# Patient Record
Sex: FEMALE | Race: WHITE | Employment: FULL TIME | URBAN - METROPOLITAN AREA
[De-identification: names, ages, dates, MRNs, and addresses within clinical notes are randomized per-mention and may not be internally consistent; named-entity substitution may affect disease eponyms.]

---

## 2017-02-06 RX ORDER — RISEDRONATE SODIUM 35 MG/1
TABLET, FILM COATED ORAL
Qty: 12 TABLET | Refills: 2 | Status: SHIPPED | OUTPATIENT
Start: 2017-02-06 | End: 2021-08-02 | Stop reason: ALTCHOICE

## 2017-11-06 ENCOUNTER — HOSPITAL ENCOUNTER (OUTPATIENT)
Age: 52
Setting detail: OBSERVATION
Discharge: HOME | End: 2017-11-07
Attending: INTERNAL MEDICINE | Admitting: INTERNAL MEDICINE
Payer: COMMERCIAL

## 2017-11-06 ENCOUNTER — APPOINTMENT (OUTPATIENT)
Dept: GENERAL RADIOLOGY | Age: 52
End: 2017-11-06
Payer: COMMERCIAL

## 2017-11-06 DIAGNOSIS — R07.9 CHEST PAIN, UNSPECIFIED TYPE: Primary | ICD-10-CM

## 2017-11-06 LAB
ALBUMIN SERPL-MCNC: 4.6 G/DL (ref 3.9–4.9)
ALP BLD-CCNC: 61 U/L (ref 40–130)
ALT SERPL-CCNC: 24 U/L (ref 0–33)
ANION GAP SERPL CALCULATED.3IONS-SCNC: 17 MEQ/L (ref 7–13)
APTT: 23.4 SEC (ref 21.6–35.4)
AST SERPL-CCNC: 18 U/L (ref 0–35)
BILIRUB SERPL-MCNC: 0.9 MG/DL (ref 0–1.2)
BUN BLDV-MCNC: 19 MG/DL (ref 6–20)
CALCIUM SERPL-MCNC: 10.1 MG/DL (ref 8.6–10.2)
CHLORIDE BLD-SCNC: 101 MEQ/L (ref 98–107)
CK MB: 3.8 NG/ML (ref 0–3.8)
CO2: 22 MEQ/L (ref 22–29)
CREAT SERPL-MCNC: 0.78 MG/DL (ref 0.5–0.9)
CREATINE KINASE-MB INDEX: 2.7 % (ref 0–3.5)
D DIMER: 0.2 MG/L FEU (ref 0–0.5)
GFR AFRICAN AMERICAN: >60
GFR NON-AFRICAN AMERICAN: >60
GLOBULIN: 2.5 G/DL (ref 2.3–3.5)
GLUCOSE BLD-MCNC: 102 MG/DL (ref 74–109)
HCT VFR BLD CALC: 42.3 % (ref 37–47)
HEMOGLOBIN: 14.2 G/DL (ref 12–16)
INR BLD: 1
MCH RBC QN AUTO: 29.6 PG (ref 27–31.3)
MCHC RBC AUTO-ENTMCNC: 33.6 % (ref 33–37)
MCV RBC AUTO: 88.2 FL (ref 82–100)
PDW BLD-RTO: 13.8 % (ref 11.5–14.5)
PLATELET # BLD: 239 K/UL (ref 130–400)
POTASSIUM SERPL-SCNC: 3.9 MEQ/L (ref 3.5–5.1)
PRO-BNP: 101 PG/ML
PRO-BNP: 125 PG/ML
PROTHROMBIN TIME: 10.2 SEC (ref 8.1–13.7)
RBC # BLD: 4.8 M/UL (ref 4.2–5.4)
SODIUM BLD-SCNC: 140 MEQ/L (ref 132–144)
TOTAL CK: 142 U/L (ref 0–170)
TOTAL PROTEIN: 7.1 G/DL (ref 6.4–8.1)
TROPONIN: <0.01 NG/ML (ref 0–0.01)
WBC # BLD: 9.8 K/UL (ref 4.8–10.8)

## 2017-11-06 PROCEDURE — 96372 THER/PROPH/DIAG INJ SC/IM: CPT

## 2017-11-06 PROCEDURE — 6370000000 HC RX 637 (ALT 250 FOR IP): Performed by: NURSE PRACTITIONER

## 2017-11-06 PROCEDURE — 71010 XR CHEST PORTABLE: CPT

## 2017-11-06 PROCEDURE — 96374 THER/PROPH/DIAG INJ IV PUSH: CPT

## 2017-11-06 PROCEDURE — 85027 COMPLETE CBC AUTOMATED: CPT

## 2017-11-06 PROCEDURE — 6370000000 HC RX 637 (ALT 250 FOR IP): Performed by: PHYSICIAN ASSISTANT

## 2017-11-06 PROCEDURE — G0378 HOSPITAL OBSERVATION PER HR: HCPCS

## 2017-11-06 PROCEDURE — 85610 PROTHROMBIN TIME: CPT

## 2017-11-06 PROCEDURE — 85379 FIBRIN DEGRADATION QUANT: CPT

## 2017-11-06 PROCEDURE — 80053 COMPREHEN METABOLIC PANEL: CPT

## 2017-11-06 PROCEDURE — 82553 CREATINE MB FRACTION: CPT

## 2017-11-06 PROCEDURE — 6360000002 HC RX W HCPCS: Performed by: PHYSICIAN ASSISTANT

## 2017-11-06 PROCEDURE — 6370000000 HC RX 637 (ALT 250 FOR IP): Performed by: INTERNAL MEDICINE

## 2017-11-06 PROCEDURE — 36415 COLL VENOUS BLD VENIPUNCTURE: CPT

## 2017-11-06 PROCEDURE — 83880 ASSAY OF NATRIURETIC PEPTIDE: CPT

## 2017-11-06 PROCEDURE — 6360000002 HC RX W HCPCS: Performed by: INTERNAL MEDICINE

## 2017-11-06 PROCEDURE — 84484 ASSAY OF TROPONIN QUANT: CPT

## 2017-11-06 PROCEDURE — 85730 THROMBOPLASTIN TIME PARTIAL: CPT

## 2017-11-06 PROCEDURE — 93005 ELECTROCARDIOGRAM TRACING: CPT

## 2017-11-06 PROCEDURE — 2580000003 HC RX 258: Performed by: NURSE PRACTITIONER

## 2017-11-06 PROCEDURE — 82550 ASSAY OF CK (CPK): CPT

## 2017-11-06 PROCEDURE — 99285 EMERGENCY DEPT VISIT HI MDM: CPT

## 2017-11-06 RX ORDER — MORPHINE SULFATE 2 MG/ML
2 INJECTION, SOLUTION INTRAMUSCULAR; INTRAVENOUS
Status: DISCONTINUED | OUTPATIENT
Start: 2017-11-06 | End: 2017-11-07 | Stop reason: HOSPADM

## 2017-11-06 RX ORDER — ASPIRIN 81 MG/1
324 TABLET, CHEWABLE ORAL ONCE
Status: COMPLETED | OUTPATIENT
Start: 2017-11-06 | End: 2017-11-06

## 2017-11-06 RX ORDER — LISINOPRIL 5 MG/1
5 TABLET ORAL DAILY
COMMUNITY
End: 2021-08-02 | Stop reason: ALTCHOICE

## 2017-11-06 RX ORDER — ACETAMINOPHEN 325 MG/1
650 TABLET ORAL EVERY 4 HOURS PRN
Status: DISCONTINUED | OUTPATIENT
Start: 2017-11-06 | End: 2017-11-07 | Stop reason: HOSPADM

## 2017-11-06 RX ORDER — ONDANSETRON 2 MG/ML
4 INJECTION INTRAMUSCULAR; INTRAVENOUS ONCE
Status: COMPLETED | OUTPATIENT
Start: 2017-11-06 | End: 2017-11-06

## 2017-11-06 RX ORDER — SODIUM CHLORIDE 0.9 % (FLUSH) 0.9 %
10 SYRINGE (ML) INJECTION PRN
Status: DISCONTINUED | OUTPATIENT
Start: 2017-11-06 | End: 2017-11-07 | Stop reason: HOSPADM

## 2017-11-06 RX ORDER — MORPHINE SULFATE 4 MG/ML
4 INJECTION, SOLUTION INTRAMUSCULAR; INTRAVENOUS
Status: DISCONTINUED | OUTPATIENT
Start: 2017-11-06 | End: 2017-11-07 | Stop reason: HOSPADM

## 2017-11-06 RX ORDER — SODIUM CHLORIDE 0.9 % (FLUSH) 0.9 %
10 SYRINGE (ML) INJECTION EVERY 12 HOURS SCHEDULED
Status: DISCONTINUED | OUTPATIENT
Start: 2017-11-06 | End: 2017-11-07 | Stop reason: HOSPADM

## 2017-11-06 RX ORDER — CALCIUM CARBONATE 500(1250)
500 TABLET ORAL DAILY
Status: DISCONTINUED | OUTPATIENT
Start: 2017-11-06 | End: 2017-11-06

## 2017-11-06 RX ORDER — ONDANSETRON 2 MG/ML
4 INJECTION INTRAMUSCULAR; INTRAVENOUS EVERY 6 HOURS PRN
Status: DISCONTINUED | OUTPATIENT
Start: 2017-11-06 | End: 2017-11-07 | Stop reason: HOSPADM

## 2017-11-06 RX ORDER — ASPIRIN 81 MG/1
81 TABLET, CHEWABLE ORAL DAILY
Status: DISCONTINUED | OUTPATIENT
Start: 2017-11-07 | End: 2017-11-07 | Stop reason: HOSPADM

## 2017-11-06 RX ORDER — ATORVASTATIN CALCIUM 20 MG/1
20 TABLET, FILM COATED ORAL NIGHTLY
Status: DISCONTINUED | OUTPATIENT
Start: 2017-11-06 | End: 2017-11-07 | Stop reason: HOSPADM

## 2017-11-06 RX ORDER — LISINOPRIL 5 MG/1
5 TABLET ORAL DAILY
Status: DISCONTINUED | OUTPATIENT
Start: 2017-11-06 | End: 2017-11-07 | Stop reason: HOSPADM

## 2017-11-06 RX ORDER — NITROGLYCERIN 0.4 MG/1
0.4 TABLET SUBLINGUAL EVERY 5 MIN PRN
Status: DISCONTINUED | OUTPATIENT
Start: 2017-11-06 | End: 2017-11-07 | Stop reason: HOSPADM

## 2017-11-06 RX ADMIN — ASPIRIN 81 MG 324 MG: 81 TABLET ORAL at 12:11

## 2017-11-06 RX ADMIN — ACETAMINOPHEN 650 MG: 325 TABLET ORAL at 21:37

## 2017-11-06 RX ADMIN — ATORVASTATIN CALCIUM 20 MG: 20 TABLET, FILM COATED ORAL at 21:37

## 2017-11-06 RX ADMIN — Medication 30 ML: at 21:38

## 2017-11-06 RX ADMIN — LISINOPRIL 5 MG: 5 TABLET ORAL at 21:37

## 2017-11-06 RX ADMIN — Medication 10 ML: at 21:42

## 2017-11-06 RX ADMIN — NITROGLYCERIN 1 INCH: 20 OINTMENT TOPICAL at 17:04

## 2017-11-06 RX ADMIN — VITAMIN D, TAB 1000IU (100/BT) 1000 UNITS: 25 TAB at 21:38

## 2017-11-06 RX ADMIN — ONDANSETRON 4 MG: 2 INJECTION INTRAMUSCULAR; INTRAVENOUS at 12:12

## 2017-11-06 RX ADMIN — NITROGLYCERIN 1 INCH: 20 OINTMENT TOPICAL at 12:14

## 2017-11-06 RX ADMIN — ENOXAPARIN SODIUM 40 MG: 40 INJECTION, SOLUTION INTRAVENOUS; SUBCUTANEOUS at 17:04

## 2017-11-06 ASSESSMENT — PAIN DESCRIPTION - DESCRIPTORS
DESCRIPTORS: DULL
DESCRIPTORS: ACHING

## 2017-11-06 ASSESSMENT — ENCOUNTER SYMPTOMS
SHORTNESS OF BREATH: 0
ALLERGIC/IMMUNOLOGIC NEGATIVE: 1
ABDOMINAL PAIN: 0
VOMITING: 0
GASTROINTESTINAL NEGATIVE: 1
ABDOMINAL DISTENTION: 0
RHINORRHEA: 0
EYE DISCHARGE: 0
SORE THROAT: 0
NAUSEA: 1
RESPIRATORY NEGATIVE: 1
EYES NEGATIVE: 1
CONSTIPATION: 0
COLOR CHANGE: 0

## 2017-11-06 ASSESSMENT — PAIN SCALES - GENERAL
PAINLEVEL_OUTOF10: 1
PAINLEVEL_OUTOF10: 6
PAINLEVEL_OUTOF10: 3
PAINLEVEL_OUTOF10: 0

## 2017-11-06 ASSESSMENT — PAIN DESCRIPTION - LOCATION
LOCATION: CHEST
LOCATION: CHEST;NECK;ARM

## 2017-11-06 ASSESSMENT — PAIN DESCRIPTION - PAIN TYPE
TYPE: ACUTE PAIN
TYPE: ACUTE PAIN

## 2017-11-06 ASSESSMENT — HEART SCORE: ECG: 0

## 2017-11-06 ASSESSMENT — PAIN DESCRIPTION - ORIENTATION: ORIENTATION: MID;LEFT

## 2017-11-06 NOTE — H&P
[facility] HEART CARDIOLOGY HISTORY AND PHYSICAL NOTE    PATIENT NAME: Ema Kelly  PATIENT MRN: [id number]  SERVICE DATE:  11/6/2017  SERVICE TIME: 2:41 PM    SHARED VISIT CNP: Tyree House NP  PRIMARY CARE PHYSICIAN: Jose Gaytan MD  CARDIOLOGIST : Dr. Raissa Warner: Dr. Pepe Hedrick    ================================================================    CHIEF COMPLAINT:    Chest pain    IMPRESSIONS:  1. Chest pain with both typical and atypical features. No recurrence of midsternal heaviness and nausea. Does have some reproducible pain with inspiration, no other associated symptoms. Initial EKG and troponin normal.  2. HTN, controlled      RECOMMENDATIONS:  1. Repeat EKG, cycle troponins, check echo, d-dimer with next lab draw  2. ASA 81mg QD  3. NTG paste q 6  4. Further recommendations regarding ischemic eval to follow    CARDIAC HISTORY:  1. Normal LV systolic function per stress echo 2002  2. No evidence of ischemia per stress echo 2002  3. No significant valvular heart disease  4. HTN    NON CARDIAC HISTORY:  1. Osteopenia  2. Obesity    HPI:   Ema Kelly is a 45 y.o. female who presented to the emergency department with complaints of midsternal chest pain. Initial episode occurred late last week, which she describes as a sensation of midsternal heaviness, accompanied by shortness of breath and nausea. The initial episode occurred while she was driving home from work, when she got into the house she proceeded to take one of her 's nitroglycerin with complete relief. She has had no recurrence over the weekend. Today while at work she noticed a similar sensation of heaviness this time radiating to her neck. She felt very nauseated, lightheaded, and short of breath. She called her family doctor and was instructed to go to the emergency room for further evaluation. She was given baby aspirin and nitroglycerin with complete relief.  Initial EKG shows no acute ischemic abnormalities, normal sinus rhythm. Initial troponin is negative. No other lab abnormalities noted. Chest x-ray shows no active cardiopulmonary disease. She has had no recurrence of discomfort since her admission, with the exception of some chest pain that is reproduced with deep inspiration. PROBLEM LIST:  Patient Active Problem List   Diagnosis    Calculus of gallbladder       PAST MEDICAL HISTORY:    Past Medical History:   Diagnosis Date    Osteopenia 2013       PAST SURGICAL HISTORY:  Past Surgical History:   Procedure Laterality Date    HYSTERECTOMY         MEDICATIONS:  No current facility-administered medications for this encounter. Current Outpatient Prescriptions   Medication Sig Dispense Refill    lisinopril (PRINIVIL;ZESTRIL) 5 MG tablet Take 5 mg by mouth daily      risedronate (ACTONEL) 35 MG tablet TAKE 1 TABLET BY MOUTH ONCE A WEEK AS DIRECTED 12 tablet 2    estradiol (CLIMARA) 0.05 MG/24HR APPLY PATCH TO DRY SKIN ONCE EVERY WEEK 24 patch 1    Vitamin D (CHOLECALCIFEROL) 1000 UNITS CAPS capsule Take 1,000 Units by mouth daily.  calcium carbonate (OSCAL) 500 MG TABS tablet Take 500 mg by mouth daily. ALLERGIES AND DRUG INTOLERANCES:   Allergies   Allergen Reactions    Penicillins      hives    Sulfa Antibiotics      hives       FAMILY HISTORY:    Family History   Problem Relation Age of Onset    Breast Cancer Paternal Grandmother     Breast Cancer Paternal Aunt        SOCIAL HISTORY:    Social History     Social History    Marital status:      Spouse name: N/A    Number of children: N/A    Years of education: N/A     Occupational History    Not on file.      Social History Main Topics    Smoking status: Never Smoker    Smokeless tobacco: Not on file    Alcohol use No    Drug use: Unknown    Sexual activity: Yes     Partners: Male     Other Topics Concern    Not on file     Social History Narrative    No narrative on file       ROS:   Review Results   Component Value Date    PROBNP 125 11/06/2017      TSH: No results found for: TSH   Cardiac Injury Profile:   Recent Labs      11/06/17   1215   CKTOTAL  142   CKMB  3.8   TROPONINI  <0.010      Lipid Profile: No results found for: TRIG, HDL, LDLCALC, CHOL   Hemoglobin A1C: No components found for: HGBA1C       ================================================================      Thank you for your consideration for this consultation.   This H & P was completed after chart review and bedside examination done by myself in conjunction with Dr. Alfredo Phillips: Edie Pardo RN, MSN, CNP

## 2017-11-06 NOTE — ED TRIAGE NOTES
Pt states she started having midsternal chest pain radiating into her left arm and up neck on the way to work around 1030a this morning. Pt statse she became very nauseated but denies any vomiting. Pt denies any fever, chills, diarrhea. Pt states this happened thurs and she took 1 nitro from her  and pain went away. Pt states this pain is a squeezing type pain and made her dizzy and sob. Pt is a+o x4 msps intact lungs clear bilat.

## 2017-11-06 NOTE — ED PROVIDER NOTES
[address] [facility] [place], [place] ED  eMERGENCY dEPARTMENT eNCOUnter      Pt Name: Raven Isaac  MRN: [id number]  [place] 1965  Date of evaluation: 11/6/2017  Provider: Makayla Zhao PA-C    CHIEF COMPLAINT       Chief Complaint   Patient presents with    Chest Pain    Shortness of Breath         HISTORY OF PRESENT ILLNESS   (Location/Symptom, Timing/Onset, Context/Setting, Quality, Duration, Modifying Factors, Severity)  Note limiting factors. Raven Isaac is a 45 y.o. female who presents to the emergency department With complaint of midsternal chest pain which patient states started last Thursday. She states she was driving on the road had a sudden onset of midsternal chest pain she became short of breath and nauseated she states it lasted for approximately one hour, she drove home she states she took one of her 's nitroglycerin and her pain was 100% resolved. She states she did not have any episodes over the weekend she said she was on her way to work this morning had midsternal chest pain again with radiation up into her neck became very nauseated again. He contacted her family doctor and they advised her to come to the emergency department. She is rating her pain at this time as a 2 or 3 more of an annoyance, but still feels very nauseated. She denies any shortness of breath she denies any acute injury. She denies any past cardiac history denies any past family history of heart disease. Pt is requesting [facility], Dr Charly Laird, since that whom her  is followed by. HPI    Nursing Notes were reviewed. REVIEW OF SYSTEMS    (2-9 systems for level 4, 10 or more for level 5)     Review of Systems   Constitutional: Negative for activity change and appetite change. HENT: Negative for congestion, ear discharge, ear pain, nosebleeds, rhinorrhea and sore throat. Eyes: Negative for discharge. Respiratory: Negative for shortness of breath. Cardiovascular: Positive for chest pain. Negative for palpitations and leg swelling. Gastrointestinal: Positive for nausea. Negative for abdominal distention, abdominal pain, constipation and vomiting. Genitourinary: Negative for decreased urine volume, difficulty urinating, dysuria and urgency. Musculoskeletal: Positive for neck pain. Negative for arthralgias, myalgias and neck stiffness. Skin: Negative for color change, pallor and rash. Neurological: Negative for dizziness, tremors, syncope, weakness, numbness and headaches. Psychiatric/Behavioral: Negative for agitation and confusion. Except as noted above the remainder of the review of systems was reviewed and negative. PAST MEDICAL HISTORY     Past Medical History:   Diagnosis Date    Osteopenia 2013         SURGICAL HISTORY       Past Surgical History:   Procedure Laterality Date    HYSTERECTOMY           CURRENT MEDICATIONS       Previous Medications    CALCIUM CARBONATE (OSCAL) 500 MG TABS TABLET    Take 500 mg by mouth daily. ESTRADIOL (CLIMARA) 0.05 MG/24HR    APPLY PATCH TO DRY SKIN ONCE EVERY WEEK    LISINOPRIL (PRINIVIL;ZESTRIL) 5 MG TABLET    Take 5 mg by mouth daily    RISEDRONATE (ACTONEL) 35 MG TABLET    TAKE 1 TABLET BY MOUTH ONCE A WEEK AS DIRECTED    VITAMIN D (CHOLECALCIFEROL) 1000 UNITS CAPS CAPSULE    Take 1,000 Units by mouth daily.        ALLERGIES     Penicillins and Sulfa antibiotics    FAMILY HISTORY       Family History   Problem Relation Age of Onset    Breast Cancer Paternal Grandmother     Breast Cancer Paternal Aunt           SOCIAL HISTORY       Social History     Social History    Marital status:      Spouse name: N/A    Number of children: N/A    Years of education: N/A     Social History Main Topics    Smoking status: Never Smoker    Smokeless tobacco: Not on file    Alcohol use No    Drug use: Unknown    Sexual activity: Yes     Partners: Male     Other Topics Concern    Not on file     Social History Narrative    No note that portions of this note were completed with a voice recognition program.  Efforts were made to edit the dictations but occasionally words are mis-transcribed.)    Nigel Mike PA-C (electronically signed)  Attending Emergency Physician         Nigel Mike PA-C  11/06/17 [id number]

## 2017-11-06 NOTE — LETTER
[facility] 5W Observation  [facility] [address]  [place] [address] [facility]  Phone: [phone]             November 7, 2017    Patient: Maxx Farris   YOB: 1965   Date of Visit: 11/6/2017       To Whom It May Concern:    Melinda Gilliland was seen and treated in our facility  beginning 11/6/2017 until 11/7/17 . She may return to work on 11/8/17.       Sincerely,       Saritha Muller NP         Signature:__________________________________

## 2017-11-07 ENCOUNTER — APPOINTMENT (OUTPATIENT)
Dept: NUCLEAR MEDICINE | Age: 52
End: 2017-11-07
Payer: COMMERCIAL

## 2017-11-07 VITALS
TEMPERATURE: 97.5 F | OXYGEN SATURATION: 98 % | WEIGHT: 180 LBS | DIASTOLIC BLOOD PRESSURE: 68 MMHG | SYSTOLIC BLOOD PRESSURE: 91 MMHG | RESPIRATION RATE: 18 BRPM | HEIGHT: 61 IN | BODY MASS INDEX: 33.99 KG/M2 | HEART RATE: 62 BPM

## 2017-11-07 LAB
ANION GAP SERPL CALCULATED.3IONS-SCNC: 10 MEQ/L (ref 7–13)
BUN BLDV-MCNC: 19 MG/DL (ref 6–20)
CALCIUM SERPL-MCNC: 9.1 MG/DL (ref 8.6–10.2)
CHLORIDE BLD-SCNC: 102 MEQ/L (ref 98–107)
CHOLESTEROL, TOTAL: 194 MG/DL (ref 0–199)
CO2: 28 MEQ/L (ref 22–29)
CREAT SERPL-MCNC: 0.78 MG/DL (ref 0.5–0.9)
EKG ATRIAL RATE: 51 BPM
EKG ATRIAL RATE: 67 BPM
EKG P AXIS: -8 DEGREES
EKG P AXIS: 8 DEGREES
EKG P-R INTERVAL: 124 MS
EKG P-R INTERVAL: 150 MS
EKG Q-T INTERVAL: 410 MS
EKG Q-T INTERVAL: 454 MS
EKG QRS DURATION: 80 MS
EKG QRS DURATION: 88 MS
EKG QTC CALCULATION (BAZETT): 418 MS
EKG QTC CALCULATION (BAZETT): 433 MS
EKG R AXIS: -1 DEGREES
EKG R AXIS: 23 DEGREES
EKG T AXIS: 43 DEGREES
EKG T AXIS: 57 DEGREES
EKG VENTRICULAR RATE: 51 BPM
EKG VENTRICULAR RATE: 67 BPM
GFR AFRICAN AMERICAN: >60
GFR NON-AFRICAN AMERICAN: >60
GLUCOSE BLD-MCNC: 91 MG/DL (ref 74–109)
HCT VFR BLD CALC: 38.8 % (ref 37–47)
HDLC SERPL-MCNC: 48 MG/DL (ref 40–59)
HEMOGLOBIN: 12.8 G/DL (ref 12–16)
INR BLD: 1
LDL CHOLESTEROL CALCULATED: 124 MG/DL (ref 0–129)
LV EF: 74 %
LVEF MODALITY: NORMAL
MAGNESIUM: 2.4 MG/DL (ref 1.7–2.3)
MCH RBC QN AUTO: 29.4 PG (ref 27–31.3)
MCHC RBC AUTO-ENTMCNC: 33 % (ref 33–37)
MCV RBC AUTO: 89.4 FL (ref 82–100)
PDW BLD-RTO: 13.7 % (ref 11.5–14.5)
PLATELET # BLD: 211 K/UL (ref 130–400)
POTASSIUM SERPL-SCNC: 4.8 MEQ/L (ref 3.5–5.1)
PROTHROMBIN TIME: 10.6 SEC (ref 8.1–13.7)
RBC # BLD: 4.34 M/UL (ref 4.2–5.4)
SODIUM BLD-SCNC: 140 MEQ/L (ref 132–144)
TRIGL SERPL-MCNC: 109 MG/DL (ref 0–200)
WBC # BLD: 7.7 K/UL (ref 4.8–10.8)

## 2017-11-07 PROCEDURE — 85027 COMPLETE CBC AUTOMATED: CPT

## 2017-11-07 PROCEDURE — 83735 ASSAY OF MAGNESIUM: CPT

## 2017-11-07 PROCEDURE — 78452 HT MUSCLE IMAGE SPECT MULT: CPT

## 2017-11-07 PROCEDURE — 6370000000 HC RX 637 (ALT 250 FOR IP): Performed by: NURSE PRACTITIONER

## 2017-11-07 PROCEDURE — 85610 PROTHROMBIN TIME: CPT

## 2017-11-07 PROCEDURE — 93306 TTE W/DOPPLER COMPLETE: CPT

## 2017-11-07 PROCEDURE — G0378 HOSPITAL OBSERVATION PER HR: HCPCS

## 2017-11-07 PROCEDURE — 80048 BASIC METABOLIC PNL TOTAL CA: CPT

## 2017-11-07 PROCEDURE — 93017 CV STRESS TEST TRACING ONLY: CPT

## 2017-11-07 PROCEDURE — A9502 TC99M TETROFOSMIN: HCPCS | Performed by: INTERNAL MEDICINE

## 2017-11-07 PROCEDURE — G0008 ADMIN INFLUENZA VIRUS VAC: HCPCS | Performed by: INTERNAL MEDICINE

## 2017-11-07 PROCEDURE — 2580000003 HC RX 258: Performed by: NURSE PRACTITIONER

## 2017-11-07 PROCEDURE — 80061 LIPID PANEL: CPT

## 2017-11-07 PROCEDURE — 93005 ELECTROCARDIOGRAM TRACING: CPT

## 2017-11-07 PROCEDURE — 90656 IIV3 VACC NO PRSV 0.5 ML IM: CPT | Performed by: INTERNAL MEDICINE

## 2017-11-07 PROCEDURE — 36415 COLL VENOUS BLD VENIPUNCTURE: CPT

## 2017-11-07 PROCEDURE — 6360000002 HC RX W HCPCS: Performed by: INTERNAL MEDICINE

## 2017-11-07 PROCEDURE — 3430000000 HC RX DIAGNOSTIC RADIOPHARMACEUTICAL: Performed by: INTERNAL MEDICINE

## 2017-11-07 RX ORDER — SODIUM CHLORIDE 0.9 % (FLUSH) 0.9 %
10 SYRINGE (ML) INJECTION PRN
Status: DISCONTINUED | OUTPATIENT
Start: 2017-11-07 | End: 2017-11-07 | Stop reason: HOSPADM

## 2017-11-07 RX ADMIN — ASPIRIN 81 MG 81 MG: 81 TABLET ORAL at 13:29

## 2017-11-07 RX ADMIN — Medication 20 ML: at 11:19

## 2017-11-07 RX ADMIN — A/SINGAPORE/GP1908/2015 IVR-180 (AN A/MICHIGAN/45/2015 (H1N1)PDM09-LIKE VIRUS, A/HONG KONG/4801/2014, NYMC X-263B (H3N2) (AN A/HONG KONG/4801/2014-LIKE VIRUS), AND B/BRISBANE/60/2008, WILD TYPE (A B/BRISBANE/60/2008-LIKE VIRUS) 0.5 ML: 15; 15; 15 INJECTION, SUSPENSION INTRAMUSCULAR at 13:33

## 2017-11-07 RX ADMIN — TETROFOSMIN 11.6 MILLICURIE: 0.23 INJECTION, POWDER, LYOPHILIZED, FOR SOLUTION INTRAVENOUS at 08:45

## 2017-11-07 RX ADMIN — Medication 10 ML: at 08:56

## 2017-11-07 RX ADMIN — ACETAMINOPHEN 650 MG: 325 TABLET ORAL at 13:30

## 2017-11-07 RX ADMIN — TETROFOSMIN 31 MILLICURIE: 0.23 INJECTION, POWDER, LYOPHILIZED, FOR SOLUTION INTRAVENOUS at 10:33

## 2017-11-07 RX ADMIN — Medication 10 ML: at 08:15

## 2017-11-07 ASSESSMENT — PAIN SCALES - GENERAL: PAINLEVEL_OUTOF10: 5

## 2017-11-07 NOTE — FLOWSHEET NOTE
Vitals obtained. A.M. Assessment complete. Pt denies CP at this time. SB on tele. No SOB noted. Pt going down for stress test this a.m. No current needs at this time. Call light within reach. Electronically signed by Luisana Jimenez.  Yanique Alva on 11/7/2017 at 8:38 AM

## 2017-11-07 NOTE — PROGRESS NOTES
IV heplock removed. Catheter tip intact. Pt discharge instructions given. Pt verbalized understanding with no further questions. Pt to main lobby via w/c with belongings and d/c instructions. Electronically signed by Kelsea Mitchell on 11/7/2017 at 4:05 PM

## 2017-11-07 NOTE — FLOWSHEET NOTE
Pt to NM for stress test via w/c. Electronically signed by Roz Ng.  Oren Kenyon on 11/7/2017 at 8:41 AM

## 2017-11-07 NOTE — DISCHARGE SUMMARY
[facility] HEART   Discharge Note      Name:  Kary Guzman  YOB: 1965  Medical Record Number:  [id number]  Date of Admission:  11/6/2017  Date of Discharge:  11/7/2017    Admitting physician:  Hollie Kaur MD  Discharge Attending:  Mary Ramires CNP  Primary Care Physician:  Darius Callahan MD  Consultants[de-identified] none  Discharge to:  [address] [place] [address] LIST:  Chest pain    DISCHARGE DIAGNOSES:  Principal Problem:    Chest pain in adult      PROCEDURES:    stress [facility] COURSE :   Kary Guzman is a 45 old female who presented to the emergency department with complaints of midsternal chest pain accompanied by shortness of breath and nausea. The patient was initially relieved by sublingual nitroglycerin. She did have reproducible pain with deep inspiration or palpation to the mid chest area. Serial troponins negative, and EKGs have remained unremarkable for acute ischemic abnormalities. A d-dimer was obtained which was within normal limits. Given both typical and atypical features of her chest discomfort she underwent an exercise perfusion study on 11/7/17 which was negative for ischemia. She is presently chest pain-free and hemodynamically stable. Disposition is to be discharged home on low-dose aspirin, statin, and her antihypertensive therapy with lisinopril. She will have outpatient follow-up with cardiology in 4 weeks. Should she have worsening or recurrence of symptoms she is to seek emergency medical care. Patient verbalized understanding and is in agreement with discharge plan of care. PHYSICAL EXAM:  Vitals:    11/07/17 1330   BP: 91/68   Pulse: 62   Resp:    Temp:    SpO2: 98%     Chest: Clear to ausculation. No wheezing.   Cardiac: Cor RRR  Abdomen/groin: soft, non-tender, without masses or organomegaly and soft  Lower extremity edema: none    LAB:  Recent Results (from the past 72 hour(s))   Comprehensive Metabolic Panel    Collection Time: 11/06/17 12:15 PM   Result Value Ref Range    Sodium 140 132 - 144 mEq/L    Potassium 3.9 3.5 - 5.1 mEq/L    Chloride 101 98 - 107 mEq/L    CO2 22 22 - 29 mEq/L    Anion Gap 17 (H) 7 - 13 mEq/L    Glucose 102 74 - 109 mg/dL    BUN 19 6 - 20 mg/dL    CREATININE 0.78 0.50 - 0.90 mg/dL    GFR Non-African American >60.0 >60    GFR  >60.0 >60    Calcium 10.1 8.6 - 10.2 mg/dL    Total Protein 7.1 6.4 - 8.1 g/dL    Alb 4.6 3.9 - 4.9 g/dL    Total Bilirubin 0.9 0.0 - 1.2 mg/dL    Alkaline Phosphatase 61 40 - 130 U/L    ALT 24 0 - 33 U/L    AST 18 0 - 35 U/L    Globulin 2.5 2.3 - 3.5 g/dL   CBC    Collection Time: 11/06/17 12:15 PM   Result Value Ref Range    WBC 9.8 4.8 - 10.8 K/uL    RBC 4.80 4.20 - 5.40 M/uL    Hemoglobin 14.2 12.0 - 16.0 g/dL    Hematocrit 42.3 37.0 - 47.0 %    MCV 88.2 82.0 - 100.0 fL    MCH 29.6 27.0 - 31.3 pg    MCHC 33.6 33.0 - 37.0 %    RDW 13.8 11.5 - 14.5 %    Platelets 963 969 - 002 K/uL   Troponin    Collection Time: 11/06/17 12:15 PM   Result Value Ref Range    Troponin <0.010 0.000 - 0.010 ng/mL   CK-MB Index    Collection Time: 11/06/17 12:15 PM   Result Value Ref Range    Total  0 - 170 U/L    CK-MB 3.8 0.0 - 3.8 ng/mL    CK-MB Index 2.7 0.0 - 3.5 %   Protime-INR    Collection Time: 11/06/17 12:15 PM   Result Value Ref Range    Protime 10.2 8.1 - 13.7 sec    INR 1.0    APTT    Collection Time: 11/06/17 12:15 PM   Result Value Ref Range    aPTT 23.4 21.6 - 35.4 sec   Brain Natriuretic Peptide    Collection Time: 11/06/17 12:15 PM   Result Value Ref Range    Pro- pg/mL   EKG 12 Lead    Collection Time: 11/06/17 12:53 PM   Result Value Ref Range    Ventricular Rate 67 BPM    Atrial Rate 67 BPM    P-R Interval 124 ms    QRS Duration 80 ms    Q-T Interval 410 ms    QTc Calculation (Bazett) 433 ms    P Axis -8 degrees    R Axis -1 degrees    T Axis 43 degrees   Troponin    Collection Time: 11/06/17  4:15 PM   Result Value Ref Range    Troponin <0.010 0.000 - 0.010 ng/mL D-dimer, quantitative    Collection Time: 11/06/17  4:15 PM   Result Value Ref Range    D-Dimer, Quant 0.20 0.00 - 0.50 mg/L FEU   Brain natriuretic peptide    Collection Time: 11/06/17  4:15 PM   Result Value Ref Range    Pro- pg/mL   Troponin    Collection Time: 11/06/17  8:01 PM   Result Value Ref Range    Troponin <0.010 0.000 - 0.010 ng/mL   Basic metabolic panel    Collection Time: 11/07/17  5:44 AM   Result Value Ref Range    Sodium 140 132 - 144 mEq/L    Potassium 4.8 3.5 - 5.1 mEq/L    Chloride 102 98 - 107 mEq/L    CO2 28 22 - 29 mEq/L    Anion Gap 10 7 - 13 mEq/L    Glucose 91 74 - 109 mg/dL    BUN 19 6 - 20 mg/dL    CREATININE 0.78 0.50 - 0.90 mg/dL    GFR Non-African American >60.0 >60    GFR  >60.0 >60    Calcium 9.1 8.6 - 10.2 mg/dL   Magnesium    Collection Time: 11/07/17  5:44 AM   Result Value Ref Range    Magnesium 2.4 (H) 1.7 - 2.3 mg/dL   Lipid panel - fasting    Collection Time: 11/07/17  5:44 AM   Result Value Ref Range    Cholesterol, Total 194 0 - 199 mg/dL    Triglycerides 109 0 - 200 mg/dL    HDL 48 40 - 59 mg/dL    LDL Calculated 124 0 - 129 mg/dL   CBC    Collection Time: 11/07/17  5:44 AM   Result Value Ref Range    WBC 7.7 4.8 - 10.8 K/uL    RBC 4.34 4.20 - 5.40 M/uL    Hemoglobin 12.8 12.0 - 16.0 g/dL    Hematocrit 38.8 37.0 - 47.0 %    MCV 89.4 82.0 - 100.0 fL    MCH 29.4 27.0 - 31.3 pg    MCHC 33.0 33.0 - 37.0 %    RDW 13.7 11.5 - 14.5 %    Platelets 835 018 - 047 K/uL   Protime-INR    Collection Time: 11/07/17  5:44 AM   Result Value Ref Range    Protime 10.6 8.1 - 13.7 sec    INR 1.0    EKG 12 lead    Collection Time: 11/07/17  7:47 AM   Result Value Ref Range    Ventricular Rate 51 BPM    Atrial Rate 51 BPM    P-R Interval 150 ms    QRS Duration 88 ms    Q-T Interval 454 ms    QTc Calculation (Bazett) 418 ms    P Axis 8 degrees    R Axis 23 degrees    T Axis 57 degrees         DISCHARGE MEDICATIONS:   Kendellne Rosalio   Home Medication Instructions

## 2017-11-07 NOTE — FLOWSHEET NOTE
Shift assessment complete along with vital signs. Pt is alert and orientated x 4. Pink,warm and dry. Pt states that she feels slight pressure in her chest her main complaint at the moment is she has a headache that is in the back of her head and rates it a 8/10. Significant other is in room with pt. No sob noted. Pt has call light within reach. No other current needs at this time.  Electronically signed by Norma Golden RN on 11/6/2017 at 8:55 PM

## 2017-11-08 NOTE — PROCEDURES
myocardial ischemia or myocardial infarction by EKG  or perfusion criteria. 4.  Normal LV systolic function with calculated LV ejection fraction  of 83%. 5.   Low risk exercise Myoview cardiac perfusion stress test.      Santi Drew MD    D: 11/08/2017 12:14:24       T: 11/08/2017 12:42:14     [id number]  Job#: [id number]     Doc#: [id number]

## 2017-12-09 ENCOUNTER — HOSPITAL ENCOUNTER (OUTPATIENT)
Dept: WOMENS IMAGING | Age: 52
Discharge: HOME | End: 2017-12-09
Payer: COMMERCIAL

## 2017-12-09 DIAGNOSIS — Z12.31 ENCOUNTER FOR SCREENING MAMMOGRAM FOR BREAST CANCER: ICD-10-CM

## 2017-12-09 PROCEDURE — 77063 BREAST TOMOSYNTHESIS BI: CPT

## 2019-03-16 ENCOUNTER — HOSPITAL ENCOUNTER (OUTPATIENT)
Dept: WOMENS IMAGING | Age: 54
Discharge: HOME | End: 2019-03-18
Payer: COMMERCIAL

## 2019-03-16 DIAGNOSIS — Z13.820 ENCOUNTER FOR SCREENING FOR OSTEOPOROSIS: ICD-10-CM

## 2019-03-16 DIAGNOSIS — Z12.31 ENCOUNTER FOR SCREENING MAMMOGRAM FOR MALIGNANT NEOPLASM OF BREAST: ICD-10-CM

## 2019-03-16 PROCEDURE — 77063 BREAST TOMOSYNTHESIS BI: CPT

## 2019-03-16 PROCEDURE — 77080 DXA BONE DENSITY AXIAL: CPT

## 2019-03-18 ENCOUNTER — OUTSIDE SERVICES (OUTPATIENT)
Dept: GASTROENTEROLOGY | Age: 54
End: 2019-03-18
Payer: COMMERCIAL

## 2019-03-18 DIAGNOSIS — Z12.11 COLON CANCER SCREENING: Primary | ICD-10-CM

## 2019-03-18 PROCEDURE — 45390 COLONOSCOPY W/RESECTION: CPT | Performed by: INTERNAL MEDICINE

## 2021-01-11 ENCOUNTER — HOSPITAL ENCOUNTER (OUTPATIENT)
Dept: WOMENS IMAGING | Age: 56
Discharge: HOME | End: 2021-01-13
Payer: COMMERCIAL

## 2021-01-11 ENCOUNTER — HOSPITAL ENCOUNTER (OUTPATIENT)
Dept: LAB | Age: 56
Discharge: HOME | End: 2021-01-11
Payer: COMMERCIAL

## 2021-01-11 DIAGNOSIS — Z12.31 BREAST CANCER SCREENING BY MAMMOGRAM: ICD-10-CM

## 2021-01-11 LAB
ALBUMIN SERPL-MCNC: 4.3 G/DL (ref 3.5–4.6)
ALP BLD-CCNC: 70 U/L (ref 40–130)
ALT SERPL-CCNC: 19 U/L (ref 0–33)
ANION GAP SERPL CALCULATED.3IONS-SCNC: 12 MEQ/L (ref 9–15)
AST SERPL-CCNC: 18 U/L (ref 0–35)
BASOPHILS ABSOLUTE: 0 K/UL (ref 0–0.2)
BASOPHILS RELATIVE PERCENT: 0.4 %
BILIRUB SERPL-MCNC: 0.5 MG/DL (ref 0.2–0.7)
BUN BLDV-MCNC: 23 MG/DL (ref 6–20)
CALCIUM SERPL-MCNC: 9.2 MG/DL (ref 8.5–9.9)
CHLORIDE BLD-SCNC: 104 MEQ/L (ref 95–107)
CHOLESTEROL, TOTAL: 198 MG/DL (ref 0–199)
CO2: 22 MEQ/L (ref 20–31)
CREAT SERPL-MCNC: 0.82 MG/DL (ref 0.5–0.9)
EOSINOPHILS ABSOLUTE: 0.2 K/UL (ref 0–0.7)
EOSINOPHILS RELATIVE PERCENT: 1.9 %
GFR AFRICAN AMERICAN: >60
GFR NON-AFRICAN AMERICAN: >60
GLOBULIN: 2.5 G/DL (ref 2.3–3.5)
GLUCOSE BLD-MCNC: 89 MG/DL (ref 70–99)
HCT VFR BLD CALC: 38.7 % (ref 37–47)
HDLC SERPL-MCNC: 50 MG/DL (ref 40–59)
HEMOGLOBIN: 12.8 G/DL (ref 12–16)
LDL CHOLESTEROL CALCULATED: 126 MG/DL (ref 0–129)
LYMPHOCYTES ABSOLUTE: 2.6 K/UL (ref 1–4.8)
LYMPHOCYTES RELATIVE PERCENT: 32.9 %
MCH RBC QN AUTO: 29.9 PG (ref 27–31.3)
MCHC RBC AUTO-ENTMCNC: 33.1 % (ref 33–37)
MCV RBC AUTO: 90.4 FL (ref 82–100)
MONOCYTES ABSOLUTE: 0.8 K/UL (ref 0.2–0.8)
MONOCYTES RELATIVE PERCENT: 9.9 %
NEUTROPHILS ABSOLUTE: 4.3 K/UL (ref 1.4–6.5)
NEUTROPHILS RELATIVE PERCENT: 54.9 %
PDW BLD-RTO: 13.5 % (ref 11.5–14.5)
PLATELET # BLD: 255 K/UL (ref 130–400)
POTASSIUM SERPL-SCNC: 4.2 MEQ/L (ref 3.4–4.9)
RBC # BLD: 4.28 M/UL (ref 4.2–5.4)
SODIUM BLD-SCNC: 138 MEQ/L (ref 135–144)
TOTAL PROTEIN: 6.8 G/DL (ref 6.3–8)
TRIGL SERPL-MCNC: 110 MG/DL (ref 0–150)
WBC # BLD: 7.9 K/UL (ref 4.8–10.8)

## 2021-01-11 PROCEDURE — 85025 COMPLETE CBC W/AUTO DIFF WBC: CPT

## 2021-01-11 PROCEDURE — 36415 COLL VENOUS BLD VENIPUNCTURE: CPT

## 2021-01-11 PROCEDURE — 77067 SCR MAMMO BI INCL CAD: CPT

## 2021-01-11 PROCEDURE — 80053 COMPREHEN METABOLIC PANEL: CPT

## 2021-01-11 PROCEDURE — 80061 LIPID PANEL: CPT

## 2021-08-02 ENCOUNTER — OFFICE VISIT (OUTPATIENT)
Dept: INTERNAL MEDICINE | Age: 56
End: 2021-08-02
Payer: COMMERCIAL

## 2021-08-02 VITALS
WEIGHT: 220 LBS | OXYGEN SATURATION: 98 % | TEMPERATURE: 97.7 F | SYSTOLIC BLOOD PRESSURE: 124 MMHG | DIASTOLIC BLOOD PRESSURE: 86 MMHG | HEART RATE: 64 BPM | BODY MASS INDEX: 38.98 KG/M2 | HEIGHT: 63 IN

## 2021-08-02 DIAGNOSIS — I10 ESSENTIAL HYPERTENSION: ICD-10-CM

## 2021-08-02 DIAGNOSIS — F43.23 ADJUSTMENT DISORDER WITH MIXED ANXIETY AND DEPRESSED MOOD: Primary | ICD-10-CM

## 2021-08-02 PROCEDURE — G8427 DOCREV CUR MEDS BY ELIG CLIN: HCPCS | Performed by: PHYSICIAN ASSISTANT

## 2021-08-02 PROCEDURE — G8417 CALC BMI ABV UP PARAM F/U: HCPCS | Performed by: PHYSICIAN ASSISTANT

## 2021-08-02 PROCEDURE — 3017F COLORECTAL CA SCREEN DOC REV: CPT | Performed by: PHYSICIAN ASSISTANT

## 2021-08-02 PROCEDURE — 1036F TOBACCO NON-USER: CPT | Performed by: PHYSICIAN ASSISTANT

## 2021-08-02 PROCEDURE — 99203 OFFICE O/P NEW LOW 30 MIN: CPT | Performed by: PHYSICIAN ASSISTANT

## 2021-08-02 RX ORDER — BUSPIRONE HYDROCHLORIDE 15 MG/1
TABLET ORAL
COMMUNITY
Start: 2021-06-19 | End: 2022-03-01 | Stop reason: SDUPTHER

## 2021-08-02 RX ORDER — PAROXETINE HYDROCHLORIDE 40 MG/1
TABLET, FILM COATED ORAL
COMMUNITY
Start: 2021-07-31 | End: 2022-07-12 | Stop reason: SDUPTHER

## 2021-08-02 RX ORDER — TRAZODONE HYDROCHLORIDE 50 MG/1
50 TABLET ORAL NIGHTLY
Qty: 30 TABLET | Refills: 0 | Status: SHIPPED | OUTPATIENT
Start: 2021-08-02 | End: 2021-08-30 | Stop reason: SDUPTHER

## 2021-08-02 SDOH — ECONOMIC STABILITY: FOOD INSECURITY: WITHIN THE PAST 12 MONTHS, THE FOOD YOU BOUGHT JUST DIDN'T LAST AND YOU DIDN'T HAVE MONEY TO GET MORE.: NEVER TRUE

## 2021-08-02 SDOH — ECONOMIC STABILITY: FOOD INSECURITY: WITHIN THE PAST 12 MONTHS, YOU WORRIED THAT YOUR FOOD WOULD RUN OUT BEFORE YOU GOT MONEY TO BUY MORE.: NEVER TRUE

## 2021-08-02 ASSESSMENT — PATIENT HEALTH QUESTIONNAIRE - PHQ9
9. THOUGHTS THAT YOU WOULD BE BETTER OFF DEAD, OR OF HURTING YOURSELF: 0
2. FEELING DOWN, DEPRESSED OR HOPELESS: 2
6. FEELING BAD ABOUT YOURSELF - OR THAT YOU ARE A FAILURE OR HAVE LET YOURSELF OR YOUR FAMILY DOWN: 1
4. FEELING TIRED OR HAVING LITTLE ENERGY: 2
7. TROUBLE CONCENTRATING ON THINGS, SUCH AS READING THE NEWSPAPER OR WATCHING TELEVISION: 0
SUM OF ALL RESPONSES TO PHQ QUESTIONS 1-9: 9
3. TROUBLE FALLING OR STAYING ASLEEP: 2
SUM OF ALL RESPONSES TO PHQ9 QUESTIONS 1 & 2: 3
1. LITTLE INTEREST OR PLEASURE IN DOING THINGS: 1
SUM OF ALL RESPONSES TO PHQ QUESTIONS 1-9: 9
5. POOR APPETITE OR OVEREATING: 0
8. MOVING OR SPEAKING SO SLOWLY THAT OTHER PEOPLE COULD HAVE NOTICED. OR THE OPPOSITE, BEING SO FIGETY OR RESTLESS THAT YOU HAVE BEEN MOVING AROUND A LOT MORE THAN USUAL: 1
SUM OF ALL RESPONSES TO PHQ QUESTIONS 1-9: 9
10. IF YOU CHECKED OFF ANY PROBLEMS, HOW DIFFICULT HAVE THESE PROBLEMS MADE IT FOR YOU TO DO YOUR WORK, TAKE CARE OF THINGS AT HOME, OR GET ALONG WITH OTHER PEOPLE: 1

## 2021-08-02 ASSESSMENT — SOCIAL DETERMINANTS OF HEALTH (SDOH): HOW HARD IS IT FOR YOU TO PAY FOR THE VERY BASICS LIKE FOOD, HOUSING, MEDICAL CARE, AND HEATING?: NOT HARD AT ALL

## 2021-08-02 ASSESSMENT — ENCOUNTER SYMPTOMS
GASTROINTESTINAL NEGATIVE: 1
RESPIRATORY NEGATIVE: 1

## 2021-08-02 NOTE — PROGRESS NOTES
21    Phill Adair (: 1965) is a 60 y.o. female, New patient, here for a preventive medicine evaluation. HPI    Patient Active Problem List   Diagnosis    Calculus of gallbladder    Chest pain in adult    Adenomatous polyp of ascending colon    Essential hypertension       New patient here to establish care   Stress, moved into with dad with dementia    has heart issues , and she is working 2 jobs   Se is current taking Buspar 15 mg BID  and Paxil 40 mg   Feels that the added stress is putting too much on her mental health   HTN- not on any meds right now     Review of Systems   Constitutional: Negative. HENT: Negative. Respiratory: Negative. Cardiovascular: Negative. Gastrointestinal: Negative. Genitourinary: Negative. Musculoskeletal: Negative. Psychiatric/Behavioral: The patient is nervous/anxious. Prior to Visit Medications    Medication Sig Taking?  Authorizing Provider   PARoxetine (PAXIL) 40 MG tablet take 1 tablet by mouth every morning Yes Historical Provider, MD   busPIRone (BUSPAR) 15 MG tablet take 1 tablet by mouth three times a day Yes Historical Provider, MD   traZODone (DESYREL) 50 MG tablet Take 1 tablet by mouth nightly Yes [address], [place]        Allergies   Allergen Reactions    Sulfa Antibiotics      hives    Other Rash    Penicillins Rash     hives       Social History     Socioeconomic History    Marital status:      Spouse name: Not on file    Number of children: Not on file    Years of education: Not on file    Highest education level: Not on file   Occupational History    Not on file   Tobacco Use    Smoking status: Never Smoker    Smokeless tobacco: Never Used   Substance and Sexual Activity    Alcohol use: No    Drug use: Not on file    Sexual activity: Yes     Partners: Male   Other Topics Concern    Not on file   Social History Narrative    Not on file     Social Determinants of Health     Financial Resource Strain: Low Risk     Difficulty of Paying Living Expenses: Not hard at all   Food Insecurity: No Food Insecurity    Worried About Running Out of Food in the Last Year: Never true    Jerod of Food in the Last Year: Never true   Transportation Needs:     Lack of Transportation (Medical):  Lack of Transportation (Non-Medical):    Physical Activity:     Days of Exercise per Week:     Minutes of Exercise per Session:    Stress:     Feeling of Stress :    Social Connections:     Frequency of Communication with Friends and Family:     Frequency of Social Gatherings with Friends and Family:     Attends Rastafarian Services:     Active Member of Clubs or Organizations:     Attends Club or Organization Meetings:     Marital Status:    Intimate Partner Violence:     Fear of Current or Ex-Partner:     Emotionally Abused:     Physically Abused:     Sexually Abused:         ADVANCE DIRECTIVE: N, <no information>    Vitals:    08/02/21 1535   BP: 124/86   Site: Left Upper Arm   Position: Sitting   Cuff Size: Large Adult   Pulse: 64   Temp: 97.7 °F (36.5 °C)   TempSrc: Temporal   SpO2: 98%   Weight: 220 lb (99.8 kg)   Height: 5' 3\" (1.6 m)       Physical Exam  Vitals reviewed. Constitutional:       Appearance: Normal appearance. Cardiovascular:      Rate and Rhythm: Normal rate and regular rhythm. Pulses: Normal pulses. Heart sounds: Normal heart sounds. Pulmonary:      Breath sounds: Normal breath sounds. Musculoskeletal:      Cervical back: Normal range of motion and neck supple. Neurological:      Mental Status: She is alert and oriented to person, place, and time. Psychiatric:         Mood and Affect: Mood normal.         Behavior: Behavior normal.         Thought Content:  Thought content normal.         Judgment: Judgment normal.           Lab Results   Component Value Date    CHOL 198 01/11/2021    CHOL 194 11/07/2017    TRIG 110 01/11/2021    TRIG 109 11/07/2017    HDL 50 01/11/2021    HDL 48 11/07/2017    LDLCALC 126 01/11/2021    LDLCALC 124 11/07/2017    GLUCOSE 89 01/11/2021    GLUCOSE 91 11/07/2017    GLUCOSE 102 11/06/2017       The 10-year ASCVD risk score (Jil Fermin., et al., 2013) is: 2.7%    Values used to calculate the score:      Age: 60 years      Sex: Female      Is Non- : No      Diabetic: No      Tobacco smoker: No      Systolic Blood Pressure: 766 mmHg      Is BP treated: No      HDL Cholesterol: 50 mg/dL      Total Cholesterol: 198 mg/dL    Immunization History   Administered Date(s) Administered    COVID-19, Moderna, PF, 100mcg/0.5mL 04/07/2021, 05/07/2021    Influenza, Triv, 3 Years and older, IM, PF (Afluria 5yrs and older) 11/07/2017       Health Maintenance   Topic Date Due    Hepatitis C screen  Never done    HIV screen  Never done    Shingles Vaccine (1 of 2) Never done    Cervical cancer screen  11/13/2018    Flu vaccine (1) 09/01/2021    Potassium monitoring  01/11/2022    Creatinine monitoring  01/11/2022    Breast cancer screen  01/11/2023    DTaP/Tdap/Td vaccine (2 - Td or Tdap) 09/14/2025    Lipid screen  01/11/2026    Colon cancer screen colonoscopy  03/18/2029    COVID-19 Vaccine  Completed    Hepatitis A vaccine  Aged Out    Hepatitis B vaccine  Aged Out    Hib vaccine  Aged Out    Meningococcal (ACWY) vaccine  Aged Out    Pneumococcal 0-64 years Vaccine  Aged Out         ASSESSMENT/PLAN:  1. Adjustment disorder with mixed anxiety and depressed mood  - she will see [employer] , continue current medications  - Duglas Calderon, PhD, Psychology, [place]    2. Essential hypertension  -Stable, no meds          No follow-ups on file. An electronic signature was used to authenticate this note.     --JEANNA Campoverde on 1/29/2021 at 12:17 PM

## 2021-08-16 ENCOUNTER — VIRTUAL VISIT (OUTPATIENT)
Dept: BEHAVIORAL/MENTAL HEALTH CLINIC | Age: 56
End: 2021-08-16
Payer: COMMERCIAL

## 2021-08-16 DIAGNOSIS — Z63.6 CAREGIVER STRESS: ICD-10-CM

## 2021-08-16 DIAGNOSIS — Z63.8 FAMILY CONFLICT: ICD-10-CM

## 2021-08-16 DIAGNOSIS — F43.23 ADJUSTMENT DISORDER WITH MIXED ANXIETY AND DEPRESSED MOOD: Primary | ICD-10-CM

## 2021-08-16 DIAGNOSIS — F32.A DEPRESSION, UNSPECIFIED DEPRESSION TYPE: ICD-10-CM

## 2021-08-16 PROCEDURE — 90791 PSYCH DIAGNOSTIC EVALUATION: CPT | Performed by: PSYCHOLOGIST

## 2021-08-16 SDOH — SOCIAL STABILITY - SOCIAL INSECURITY: DEPENDENT RELATIVE NEEDING CARE AT HOME: Z63.6

## 2021-08-16 SDOH — SOCIAL STABILITY - SOCIAL INSECURITY: OTHER SPECIFIED PROBLEMS RELATED TO PRIMARY SUPPORT GROUP: Z63.8

## 2021-08-16 ASSESSMENT — PATIENT HEALTH QUESTIONNAIRE - PHQ9
6. FEELING BAD ABOUT YOURSELF - OR THAT YOU ARE A FAILURE OR HAVE LET YOURSELF OR YOUR FAMILY DOWN: 2
9. THOUGHTS THAT YOU WOULD BE BETTER OFF DEAD, OR OF HURTING YOURSELF: 0
4. FEELING TIRED OR HAVING LITTLE ENERGY: 0
2. FEELING DOWN, DEPRESSED OR HOPELESS: 3
SUM OF ALL RESPONSES TO PHQ QUESTIONS 1-9: 12
SUM OF ALL RESPONSES TO PHQ QUESTIONS 1-9: 12
5. POOR APPETITE OR OVEREATING: 2
10. IF YOU CHECKED OFF ANY PROBLEMS, HOW DIFFICULT HAVE THESE PROBLEMS MADE IT FOR YOU TO DO YOUR WORK, TAKE CARE OF THINGS AT HOME, OR GET ALONG WITH OTHER PEOPLE: 2
3. TROUBLE FALLING OR STAYING ASLEEP: 2
1. LITTLE INTEREST OR PLEASURE IN DOING THINGS: 3
SUM OF ALL RESPONSES TO PHQ9 QUESTIONS 1 & 2: 6
SUM OF ALL RESPONSES TO PHQ QUESTIONS 1-9: 12
7. TROUBLE CONCENTRATING ON THINGS, SUCH AS READING THE NEWSPAPER OR WATCHING TELEVISION: 0
8. MOVING OR SPEAKING SO SLOWLY THAT OTHER PEOPLE COULD HAVE NOTICED. OR THE OPPOSITE, BEING SO FIGETY OR RESTLESS THAT YOU HAVE BEEN MOVING AROUND A LOT MORE THAN USUAL: 0

## 2021-08-16 NOTE — PATIENT INSTRUCTIONS
1. Dedicate 5 minutes 3x/day to practice the deep breathing    2. Review the list of apps and give some a try! [facility] Box, CBTi  and progressive muscle relaxation for sleep, etc.)    3. Read the below information about stress management    4. F/U as scheduled    5. The Dance of Anger, Dr Omer    \"The entire autonomic nervous system (and through it, our internal organs and glands) is largely driven by our breathing patterns. By changing our breathing we can influence millions of biochemical reactions in our body, producing more relaxing substances such as endorphins and fewer anxiety-producing ones like adrenaline and higher blood acidity. Mindfulness of the breath is so effective that it is common to all meditative and prayer traditions. \" Anxiety Fear & Breathing - Breathing. com    \"When overcoming high levels of anxiety, it is important to learn the techniques of correct breathing. Many people who live with high levels of anxiety are known to breathe through their chest. Shallow breathing through the chest means you are disrupting the balance of oxygen and carbon dioxide necessary to be in a relaxed state. This type of breathing will perpetuate the symptoms of anxiety. \" Compassoft. com      Diaphragmatic Breathing             _____________________________________________________________________________  1. Sit in a comfortable position    2. Place one hand on your stomach and the other on your chest    3. Try to breathe so that only your stomach rises and falls    As you inhale, concentrate on your chest remaining relatively still while your stomach rises. It may be helpful for you to imagine that your pants are too big and you need to push your stomach out to hold them up. When exhaling, allow your stomach to fall in and the air to fully escape. Inhale slowly. You may choose to hold the air in for about a second. Exhale slowly.   Dont push the air out, but just let the natural pressure of your body slowly move it out. It is normal for this healthy method of breathing to feel a little awkward at first.  With practice, it will feel more natural.    4. Get your mind on your side      One other important factor in getting relaxed is your mind. Your mind and body are connected. The mind influences the body and the body influences the mind. What you do with your mind when you are trying to relax is very important. The key is to avoid thinking about stressful things. You can think about       Neutral things (e.g., counting, saying a word like calm or relax)    Pleasant things (e.g., imagining a pleasant place)     5. It is recommended that you practice 2 times per day, 10 minutes each time. ----------------------------------------------------------------------------------------------------------------------  ----------------------------------------------------------------------------------------------------------------------  ----------------------------------------------------------------------------------------------------------------------  ----------------------------------------------------------------------------------------------------------------------      CONTROLLED or MEASURED BREATHING EXERCISE: (YOU MAY WANT TO DOWNLOAD THE FREE UZMA \"VIRTUAL HOPE BOX\" TO PRACTICE THIS EXERCISE)     You can sit or stand, but be sure to soften up a little before you begin. Make sure your hands are relaxed, and your knees are soft.  Drop your shoulders and let your jaw relax.  Now breath in slowly through your nose and count to three, keep your shoulders down and allow your stomach to expand as you breathe in.   Hold the breath for a moment.  Now release your breath slowly and smoothly as you count to six.    Repeat for a couple of minutes        It can be very helpful to use tools like relaxed breathing, muscle relaxation, and guided imagery/visualization to cope with stress, pain, anxiety and depression. I recommend to all my patients that they try different techniques to find the ones that work best for them. Below are 2 websites that have several breathing, relaxation, and visualization exercises that you can listen to and download for free.    NetworkAffair.[email or web address] html  · Deep Breathing & Guided Relaxation Exercises (3)  · Guided Imagery/Visualization Exercises (5)  · Mindfulness & Meditation Exercises (3)  · Progressive Muscle Relaxation   · Soothing Instrumental Music (11)    [email or web address]  · Diaphragmatic Breathing   · Deep Breathing I   · Deep Breathing II   · Progressive Muscle Relaxation   · Guided Imagery: The Sush.[email or web address]   · Guided Imagery: [facility]   · Relaxing Phrases   · Just This Breath   · Increasing Awareness   · Sending Thoughts Away on Clouds  · Sending Thoughts Away on Leaves  · Sorting Into Boxes     -----------------------------------------------------  Below are several apps that you can download to your smartphone to help with relaxation and mood coping. [email or web address]  Platform: Axilica  Cost: Free  Your breathing has a profound effect on your body. Lazarus Parkin know this fact to be true if youve ever taken deep breaths to calm yourself down when you were upset. That exercise can often make you feel more centered, and its proof that breathing is powerful. The Lpehtwi6Udble tomeka uses guided breathing exercises to help reduce symptoms of an anxiety attack. If an attack is coming or the symptoms are unbearable, slip away into a quiet room, open your tomeka, and let the worry and stress slip away with each breath. Universal Breathing - Pranayama Free  Platform: Axilica  Cost: Free  Focused breathing exercises can help you regain composure during an anxiety attack. They can also help you prevent an anxiety attack before one starts.  Pranayama breathing techniques are common in yoga and have powerful benefits. If youre a beginner, you can benefit from the tomekas guided breathing instruction. Aiden Parsons learn how to breathe deeply, hold, and then release with better control. If it works for you, you can purchase the full course which gives you access to the entire program.  Breathing Zone   Platform: TopVisiblehone & Android  Cost: $3.99   Breathing Zone uses a clinically proven therapeutic breathing exercise that decreases your heart rate, and with daily use can help manage high blood pressure.    ----------------------------------------------  Self-Help for Anxiety Management (LOU)  Platform: iPhone & Android  Cost: Free  The Self-Help for Anxiety Management (LOU) tomeka from the [address] can help you regain control of your anxiety and emotions. Tell the tomeka how youre feeling, how anxious you are, or how worried you are. Then let the tomekas self-help features walk you through some calming or relaxation practices. If you want, you can connect with a social network of other [facility] users. Dont worry, the network isnt connected to larger networks like Twitter or [employer]. Stop Panic & Anxiety Help  Platform: Android  Cost: Free  If panic and anxiety attacks have a  on your life, this tomeka might help you let them go. The Stop Panic & Anxiety Help Android tomeka uses emotion and relaxation training audio tracks to help you fight your fears and find a state of calm. When youve overcome the attack, use the Anytime Fitness journal to record what caused the attack and how you were able to get through it. Then use this journal to learn from your experiences and prepare for the future. I Can Be Fearless by Human Progress  Platform: Globitel  Cost: Free  When you were younger, your parents might have told you that you could do anything you put your mind to.  This tomeka might not help you be an astronaut or a world famous actress, but it can help you break through your anxiety, fears, and worries to a place of calm and confidence. Open your Apple device and select what you want to be right now  calm, motivated, and confident are among the options  then let the audio hypnosis guide you through a session. Anti-Anxiety   Platform: Android  Cost: Free  You can tell the tomeka your problems by taking a diagnostic quiz about your level of stress and anxiety. Using your answers, the tomeka will design a custom treatment plan for you. Follow instructional self-help videos like How to Tolerate and Lessen Anxiety.  Keep a daily journal of your anxiety and worries, and track your progress as you learn to regain a sense of calm. Worry Box  Anxiety Self-Help  Platform: Android  Cost: Free  Have you ever wished you could put all your worries in a box, leave them there, and walk away? The Worry Box tomeka may let you do just that. The tomeka functions a lot like a journal: Write down your thoughts, anxieties, and worries, and let the tomeka help you think them through. It will ask questions, give specific anxiety-reducing help, and can even direct you to help you reduce your worries and anxiety. It is all password protected, so you can feel safe sharing the details of your stresses. -----------------------------------------------  Relax Melodies  Platform: iPhone and [employer]MartMobi Technologies  Cost: Free  Anxiety can disrupt healthy sleep patterns in more than one way. First, people who dont get enough sleep tend to feel more anxious. Then, people who are more anxious have a difficult time sleeping. Creating a calming environment may help you fall asleep and stay asleep. Relax to one of this tomekas 50 sounds. Need the music to stop once youre asleep? Set a timer, and it will stop playing. Set an alarm when you need to be awake. Then, enjoy the benefits of a good nights sleep, free from anxiety. Relaxing Sounds of Nature - Lite  Platform: iPhone  Cost: Free  You can find rest and relaxation without having to travel.  The tomeka comes with 35 nature tracks, which include soothing classics like crickets chirping, breaking waves, and a serene lake. You can download more free tracks to [place], and customize a favorite combination that helps you reduce your anxiety in a peaceful setting. Allow the sounds of nature to sweep you away from your worries in the comfort of your living room, office, or bedroom. Nature Sounds Relax and Sleep  Platform: Android  Cost: Free  If you find yourself longing for the sound of the ocean to help you relax, the [employer] and Sleep tomeka is for you. Open this tomeka whenever youre feeling anxious or stressed. You can select locations or sounds like the jungle, ocean, or thunder and slip away into a place of relaxation and comfort. If the sounds make you feel sleepy, even better. Use the tomeka to doze off into a relaxing slumber  Calming Music to Simplicity  Platform: Android  Cost: Free  [employer] arent the only relaxing tunes in smartphone apps. Music, especially some traditional "[employer]ri[employer] music, can be relaxing and soothing. The Calming Music to Simplicity tomeka contains nine traditional [facility] Hospital[facility] music selections. Press play and let your worries melt away. Relax Ocean Waves Sleep by [employer]  Platform: Android  Cost: Free  Living far from a beach doesnt mean you have to be far from total relaxation. Slip on a set of headphones and drift into a distant sand-and-suds oasis. Whether youre trying to head off an anxiety attack or just need to get some good sleep after a few anxious days, the Relax Ocean Waves Sleep tomeka helps you find a place of serenity.  --------------------------------------------------------------  Mariusz Stpales Meditation Relaxation  Platform: Android  Cost: Free  Mariusz Staples is a traditional [facility] that brings together posture, breathing, and the mind to reduce anxiety. This Android tomeka connects users with a library of relaxation videos that contain instructions for relaxing and clearing the mind.  The videos are created by Dr. Wilberto Montenegro, a psychologist with more than 20 years of experience. In addition to viewing Dr. Elaina Perez videos, you can read a variety of articles related to anxiety, meditation, and stress management. Anxiety Free  Platform: Mir Vracha   Cost: Free  Meditation requires mindfulness and a sense of presence in your thoughts. Hypnosis is one step beyond meditation. It works by sending signals to your brain and transforming it almost unknowingly. The creators of this tomeka say that its audio recordings contain subliminal signals that speak to the subconscious with powerful effect.  Hypnosis, like meditation, requires practice, and the goal is to get each user to a place where self-hypnosis is possible in order to reduce anxiety. Relax & Rest Guided Meditations  Platform: Mir Vracha and SharesPost  Cost: $0.99  While group meetings and discussions are always an option, some people find relaxation more easily on their own. This tomeka lets you relax in the space of your own home or office with three guided meditations. Breath Awareness Guided Meditation (5 minutes), Deep Rested Guided Meditation (13 minutes), and Whole Body Guided Relaxation (24 minutes) are designed specifically to help you relax and sink into a peaceful meditation moment. Equanimity - Meditation Timer & Tracker  Platform: Mir Vracha   Cost: $4.99  Meditation is one way you can cope with the symptoms and side effects of anxiety. People who meditate can eventually train themselves to stay calm when feeling stressed or anxious. Equanimity  Meditation Timer & Tracker is simple and straightforward. Time each session and watch for visual light cues to let you know how long youve been meditating. Take notes about each session, and watch your progress as you learn to manage your stress and anxiety.   Virtual Hope Box  Platform: iPhone and Android  Cost: Free  The Automatic Data Box (VHB) is a smartphone application that contains simple tools to help with coping, relaxation, distraction, and positive thinking via personalized supportive audio, video, pictures, games, mindfulness exercises, positive messages and activity planning, inspirational quotes, coping statements, and other tools. Acupressure: Heal Yourself  Platform: iPhone and Android  Cost: $1.99  Acupressure is a natural healing strategy in which you target specific areas of the body in order to alleviate pain or unwanted symptoms. Acupressure can also increase blood flow, which can boost your mood and your health. This tomeka helps you find your bodys acupressure points. Apply pressure on those points when youre feeling overwhelmed, and receive the positive, calming benefit  PTSD : Self-Management of Posttraumatic Stress  Platform: iPhone and Android  Cost: Free  This tomeka can help you learn about and manage symptoms that often occur after trauma. Provides information and coping skills for common kinds of posttraumatic stress symptoms and problems, including systematic relaxation and self-help techniques. [place]: Feel better and live happier today  Platform: iPhone  Cost: Free  Daily mood and health tracking as well as journaling. Activities are based on mindfulness, relaxation and Cognitive Behavioral Therapy. Online support, networking and emails. CBT-i : Cognitive Behavioral Therapy for Insomnia  Platform: iPhone  Cost: Free  Identify sleep patterns with a sleep diary and assessment, tools to create new sleep habits, quiet your mind and prevent insomnia in the future. You can set reminders and learn about healthy sleep habits. Mindfulness   Platform: iPhone  Cost: Free  Mindfulness practice to decrease stress, manage emotional discomfort, depression, physical pain, and other problems. It offers exercises, information, and a tracking log to that you can optimize practice.    Mindsail  Platform: iPhone  Cost: Free for the first month then $5.99/month for full access  Mindsail is a platform to discover original content from top thought-leaders and experts across relationship, career, anxiety, sleep, addiction and more. Their proprietary Programs and Moodboosts help users gain new perspectives and tools to change behaviors and live life in forward motion. [employer]able [employer] features:  Programs   Created exclusively for [employer], programs are designed to give users unique insights and tools to feed their appetite for personal growth. Users can listen to bite-sized Coaching sessions and learn new tools such as meditations, breathing exercises, visualizations and affirmations. By offering a holistic, multi-faceted approach, users will be able to reach peak mental performance and gain a deeper understanding of their physical, emotional and spiritual well-being. Moodboost   On a daily basis, people experience a wide range of emotions. Whether they're nervous about a work meeting, having trouble sleeping, or need an extra boost of confidence before their date, [employer]'s quick daily Moodboosts can help turn a user's negative thoughts into a positive state of mind. Velteo   Users can track progress and star their favorite sessions and Moodboosts to help them stay on course of their personal growth journey. Users can create a daily mental wellness routine to help them form healthier habits and change behaviors. Ask the Experts   Users can submit personal questions to be answered by [employer] experts and see those posed by the community. They will also get Smooth Sailing tips and Words of [place] to help users navigate their day.

## 2021-08-16 NOTE — PROGRESS NOTES
Behavioral Health Consultation  Jocelyn Hart, Ph.D., [facility]  Psychologist  21  4:01 PM EDT      Time spent with Patient: 30 minutes  This is patient's [facility]. Reason for Consult:  depression, anxiety, stress and family conflict, grief  Referring Provider: JAENNA Workman    Pt provided informed consent for the behavioral health program. Discussed with patient model of service to include the limits of confidentiality (i.e. abuse reporting, suicide intervention, etc.) and short-term intervention focused approach. Pt indicated understanding. Feedback given to PCP. TELEHEALTH EVALUATION -- Audio and/or Visual (During NTNID-41 public health emergency)    Due to COVID 19 outbreak, patient's office visit was converted to a virtual visit. Patient was contacted and agreed to proceed with a virtual visit via [employer]. Patient reports that they are located at home. [place]. The risks and benefits of converting to a virtual visit were discussed in light of the current infectious disease epidemic. Patient (and/or legal guardian) also understood that insurance coverage and co-pays are up to their individual insurance plans. Patient (and/or legal guardian) provides verbal consent for this visit to be billed to insurance. Pursuant to the emergency declaration under the [facility], 1135 waiver authority and the [employer] and [employer] Act, this Virtual  Visit was conducted, with patient's consent, to reduce the patient's risk of exposure to COVID-19 and provide continuity of care for an established patient. Services were provided through a video synchronous discussion virtually to substitute for in-person clinic visit. S:      Pt was referred by her newly established PCP and reports no history of MH treatment. Pt notes her mother  about 18 months ago, noting that she and spouse sold their home, moved in to be care giver. Pt is now caring for her 79yo father with alzheimer's, notes impact of his stubborn nature and relationship conflict. 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 Notes conflict related to his new relationship with a woman who is more than 20 yrs younger than him. Notes some frustration and resentment related to current situation. Notes hurtful comments and impact of the emotional environment, feeling \"trapped\". Notes current medication include Buspar, Paxil for about 5 yrs and just added trazodone. Pt is currently living with her spouse of 38yrs and  Her father. Pt describes a \"very good\" relationship with her spouse and a \"poor\" relationship with her father. 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 Pt is from this area originally, has 3 sisters, two are local, and notes a close relationship with the out of state sister, less close/some conflict with local sisters. Pt reports good friends in her life, is active on social media. Pt enjoys quiros, cleaning, taking walks. Pt is active in [employer]. Pt does not drink etoh, denies all other substance use. Pt denies SI/HI.     O:  MSE:    Appearance    alert, cooperative  Appetite abnormal: over eating  Sleep disturbance Yes  Fatigue No  Loss of pleasure Yes  Impulsive behavior Yes  Speech    spontaneous, normal rate and normal volume  Mood    Depressed  Affect    normal affect  Thought Content    intact  Thought Process    coherent  Associations    logical connections  Insight    Fair  Judgment    Intact  Orientation    oriented to person, place, time, and general circumstances  Memory    recent and remote memory intact  Attention/Concentration    intact  Morbid ideation No  Suicide Assessment    no suicidal ideation      History:    Medications:   Current Outpatient Medications   Medication Sig Dispense Refill    PARoxetine (PAXIL) 40 MG tablet take 1 tablet by mouth every morning      busPIRone (BUSPAR) 15 MG tablet take 1 tablet by mouth three times a day      traZODone (DESYREL) 50 MG tablet Take 1 tablet by mouth nightly 30 tablet 0     No current facility-administered medications for this visit. Social History:   Social History     Socioeconomic History    Marital status:      Spouse name: Not on file    Number of children: Not on file    Years of education: Not on file    Highest education level: Not on file   Occupational History    Not on file   Tobacco Use    Smoking status: Never Smoker    Smokeless tobacco: Never Used   Substance and Sexual Activity    Alcohol use: No    Drug use: Not on file    Sexual activity: Yes     Partners: Male   Other Topics Concern    Not on file   Social History Narrative    Not on file     Social Determinants of Health     Financial Resource Strain: Low Risk     Difficulty of Paying Living Expenses: Not hard at all   Food Insecurity: No Food Insecurity    Worried About Running Out of Food in the Last Year: Never true    [address] in the Last Year: Never true   Transportation Needs:     Lack of Transportation (Medical):      Lack of Transportation (Non-Medical):    Physical Activity:     Days of Exercise per Week:     Minutes of Exercise per Session:    Stress:     Feeling of Stress :    Social Connections:     Frequency of Communication with Friends and Family:     Frequency of Social Gatherings with Friends and Family:     Attends Congregational Services:     Active Member of Clubs or Organizations:     Attends Club or Organization Meetings:     Marital Status:    Intimate Partner Violence:     Fear of Current or Ex-Partner:     Emotionally Abused:     Physically Abused:     Sexually Abused:        TOBACCO:   reports that she has contain errors related to that system including grammar, punctuation and spelling as well as words and phrases that may seem inappropriate. If there are questions or concerns please feel free to contact me to clarify.

## 2021-08-31 ENCOUNTER — VIRTUAL VISIT (OUTPATIENT)
Dept: BEHAVIORAL/MENTAL HEALTH CLINIC | Age: 56
End: 2021-08-31
Payer: COMMERCIAL

## 2021-08-31 DIAGNOSIS — Z63.8 FAMILY CONFLICT: ICD-10-CM

## 2021-08-31 DIAGNOSIS — F32.A DEPRESSION, UNSPECIFIED DEPRESSION TYPE: ICD-10-CM

## 2021-08-31 DIAGNOSIS — Z63.6 CAREGIVER STRESS: ICD-10-CM

## 2021-08-31 DIAGNOSIS — F43.23 ADJUSTMENT DISORDER WITH MIXED ANXIETY AND DEPRESSED MOOD: Primary | ICD-10-CM

## 2021-08-31 PROCEDURE — 90832 PSYTX W PT 30 MINUTES: CPT | Performed by: PSYCHOLOGIST

## 2021-08-31 SDOH — SOCIAL STABILITY - SOCIAL INSECURITY: OTHER SPECIFIED PROBLEMS RELATED TO PRIMARY SUPPORT GROUP: Z63.8

## 2021-08-31 SDOH — SOCIAL STABILITY - SOCIAL INSECURITY: DEPENDENT RELATIVE NEEDING CARE AT HOME: Z63.6

## 2021-08-31 ASSESSMENT — PATIENT HEALTH QUESTIONNAIRE - PHQ9
SUM OF ALL RESPONSES TO PHQ QUESTIONS 1-9: 11
2. FEELING DOWN, DEPRESSED OR HOPELESS: 2
3. TROUBLE FALLING OR STAYING ASLEEP: 1
6. FEELING BAD ABOUT YOURSELF - OR THAT YOU ARE A FAILURE OR HAVE LET YOURSELF OR YOUR FAMILY DOWN: 2
8. MOVING OR SPEAKING SO SLOWLY THAT OTHER PEOPLE COULD HAVE NOTICED. OR THE OPPOSITE, BEING SO FIGETY OR RESTLESS THAT YOU HAVE BEEN MOVING AROUND A LOT MORE THAN USUAL: 0
5. POOR APPETITE OR OVEREATING: 1
7. TROUBLE CONCENTRATING ON THINGS, SUCH AS READING THE NEWSPAPER OR WATCHING TELEVISION: 2
SUM OF ALL RESPONSES TO PHQ9 QUESTIONS 1 & 2: 4
9. THOUGHTS THAT YOU WOULD BE BETTER OFF DEAD, OR OF HURTING YOURSELF: 0
1. LITTLE INTEREST OR PLEASURE IN DOING THINGS: 2
SUM OF ALL RESPONSES TO PHQ QUESTIONS 1-9: 11
SUM OF ALL RESPONSES TO PHQ QUESTIONS 1-9: 11
10. IF YOU CHECKED OFF ANY PROBLEMS, HOW DIFFICULT HAVE THESE PROBLEMS MADE IT FOR YOU TO DO YOUR WORK, TAKE CARE OF THINGS AT HOME, OR GET ALONG WITH OTHER PEOPLE: 1
4. FEELING TIRED OR HAVING LITTLE ENERGY: 1

## 2021-08-31 NOTE — PROGRESS NOTES
Behavioral Health Consultation  Amber L. Lindaann Lundborg, Ph.D., [facility]  Psychologist  8/31/21  1:29 PM EDT      Time spent with Patient: 30 minutes  This is patient's second  [facility] appointment. Reason for Consult:  depression, anxiety and stress  Referring Provider: JEANNA Andres    Feedback given to PCP. TELEHEALTH EVALUATION -- Audio and/or Visual (During LNWQO-87 public health emergency)    Due to COVID 19 outbreak, patient's office visit was converted to a virtual visit. Patient was contacted and agreed to proceed with a virtual visit via [employer]. Patient reports that they are located close to home. Provider Location is at her office in [facility]. The risks and benefits of converting to a virtual visit were discussed in light of the current infectious disease epidemic. Patient (and/or legal guardian) also understood that insurance coverage and co-pays are up to their individual insurance plans. Patient (and/or legal guardian) provides verbal consent for this visit to be billed to insurance. Pursuant to the emergency declaration under the [facility], [facility] waiver authority and the [employer] Act, this Virtual  Visit was conducted, with patient's consent, to reduce the patient's risk of exposure to COVID-19 and provide continuity of care for an established patient. Services were provided through a video synchronous discussion virtually to substitute for in-person clinic visit. S:    Pt reports doing \"good\" after coworkers sent her flowers, in a show of support for her dog who was put down this weekend. Pt provided an update on functioning, reports no other major changes to her stress. Pt does not being a perfectionist in her job, concerned with 4 recent errors in her work, notes impact of stress on her concentration.  Reviewed application of the [facility] interventions since last appt, started reading the book, good Needs:     Lack of Transportation (Medical):  Lack of Transportation (Non-Medical):    Physical Activity:     Days of Exercise per Week:     Minutes of Exercise per Session:    Stress:     Feeling of Stress :    Social Connections:     Frequency of Communication with Friends and Family:     Frequency of Social Gatherings with Friends and Family:     Attends Yazdanism Services:     Active Member of Clubs or Organizations:     Attends Club or Organization Meetings:     Marital Status:    Intimate Partner Violence:     Fear of Current or Ex-Partner:     Emotionally Abused:     Physically Abused:     Sexually Abused:        TOBACCO:   reports that she has never smoked. She has never used smokeless tobacco.  ETOH:   reports no history of alcohol use. Family History:   Family History   Problem Relation Age of Onset    Breast Cancer Paternal Grandmother     Breast Cancer Paternal Aunt          A:  Administered the PHQ9 which indicates a self report of moderate symptom distress. Pt would benefit from [facility] services to increase coping skills to provide symptom management/control/relief. PHQ Scores 8/31/2021 8/16/2021 8/2/2021   PHQ2 Score 4 6 3   PHQ9 Score 11 12 9     Interpretation of Total Score Depression Severity: 1-4 = Minimal depression, 5-9 = Mild depression, 10-14 = Moderate depression, 15-19 = Moderately severe depression, 20-27 = Severe depression      Diagnosis:    Adjustment disorder with mixed anxiety and depressed mood  Unspecified depressive disorder  R/O PDD vs MDD      Diagnosis Date    Osteopenia 2013           Plan:  Pt interventions:  Conducted functional assessment, [place]-setting to identify pt's primary goals for [facility] visit / overall health, Supportive techniques, Provided Psychoeducation re:  The CBT model and introduction to mindfulness, CBT to target Cognitive distortions by identifying unhelpful patterns of thinking, increasing awareness of physiological red flags when she might be engaged in this type of thinking, applying rational counter statements to increase balanced thinking, Identified maladaptive thoughts and Emphasized importance of regular practice of relaxation strategies to target / promote Symptom relief      Pt Behavioral Change Plan:    1. Review the list of unhelpful thinking patterns and pick out any \"go tos\"  2. Pay attention to your body's red flags that you might be engaged in this type of thinking  3. Jump in and apply rational counter statements to increase the balance in your thinking  4. F/U as scheduled    Please note this report has been partially produced using speech recognition software  And may cause contain errors related to that system including grammar, punctuation and spelling as well as words and phrases that may seem inappropriate. If there are questions or concerns please feel free to contact me to clarify.

## 2021-08-31 NOTE — PATIENT INSTRUCTIONS
1. Review the list of unhelpful thinking patterns and pick out any \"go tos\"  2. Pay attention to your body's red flags that you might be engaged in this type of thinking  3. Jump in and apply rational counter statements to increase the balance in your thinking  4. F/U as scheduled        Thinking Errors  provided by [employer] © 2012    Thinking errors (or cognitive distortions) are irrational thoughts that can influence your emotions. Everyone experiences cognitive distortions to some degree, but in their more extreme forms they can be harmful. Magnification and Minimization: Exaggerating or minimizing the importance of  events. One might believe their own achievements are unimportant, or that their  mistakes are excessively important. Catastrophizing: Seeing only the worst possible outcomes of a situation. Overgeneralization: Making broad interpretations from a single or few events. I felt  awkward during my job interview. I am always so awkward.     Magical Thinking: The belief that acts will influence unrelated situations. I am a good  personbad things shouldnt happen to me.     Personalization: The belief that one is responsible for events outside of their own  control. My mom is always upset. She would be fine if I did more to help her.     Jumping to Conclusions: Interpreting the meaning of a situation with little or no  Evidence. Mind Reading: Interpreting the thoughts and beliefs of others without adequate  evidence. She would not go on a date with me. She probably thinks Im ugly.     Fortune Telling: The expectation that a situation will turn out badly without adequate evidence. Emotional Reasoning: The assumption that emotions reflect the way things really are. I feel like a bad friend, therefor I must be a bad friend.     Disqualifying the Positive: Recognizing only the negative aspects of a situation while  ignoring the positive.  One might receive many compliments on an evaluation, but focus  on the single piece of negative feedback. Should Statements: The belief that things should be a certain way. I should always  be friendly.     All-or-Nothing Thinking: Thinking in absolutes such as [place], or every. I  never do a good enough job on anything.                       Automatic Thoughts (Provided by [employer] © 2013)    Our thoughts control how we feel about ourselves and the world around us. Positive thoughts lead to us feeling good and negative thoughts can put us down. Sometimes our thoughts happen so quickly that we fail to notice them, but they can still affect our mood. These are called automatic thoughts. Oftentimes, our automatic thoughts are negative and irrational. Identifying these negative  automatic thoughts and replacing them with new rational thoughts (or counterstatement) can improve our mood. Trigger   Automatic Thought     New Thought                                Example:I made a mistake at work. Im probably going to be fired. I always mess up. This is it. Im no good at this job. \"     I messed up, but mistakes happen. Im going to work through this, like I always do.                                                                                  Thought Log Provided by [employer] © 2012        Event     AutomaticThought     Behavior /  Consequence   Rational   Counterstatement       Example: Supervisor at work is angry. I must have made a mistakenow Ive done it. Theyll fire for me sure.      Feeling of sadness and anxiety/Spend time obsessing over mistakes   [facility] supervisor couldve been angry about anything. They are usually happy  with my work, so even if Ive made a mistake, it isn't a big deal.\"                                                                            Countering Anxiety Provided by [employer] © 2012    Come up with a rational counterstatement for each of the following thoughts:    Anxiety-Producing Thought:       I cant go to the mall with my hair like this--everyone will notice me. Rational Counterstatement Example:  My hair looks a little messy, but everyone will be too occupied with other things to notice. Even if they do notice, I doubt they would care.    -------------------------------------------------------------------------------    Anxiety-Producing Thought:  I know I wont be able to finish my work on time. Rational Counterstatement Example:        --------------------------------------------------------------------------------    Anxiety-Producing Thought:  I cant face by boss. Shes going to yell at me.     Rational Counterstatement Example:          Next, think of three examples of anxiety-producing thoughts and rational counterstatements from your own life:    Example 1:    Anxiety-Producing Thought:      Rational Counterstatement:      Example 2:    Anxiety-Producing Thought:      Rational Counterstatement:        Example 3:    Anxiety-Producing Thought:      Rational Counterstatement:

## 2021-09-16 ENCOUNTER — VIRTUAL VISIT (OUTPATIENT)
Dept: BEHAVIORAL/MENTAL HEALTH CLINIC | Age: 56
End: 2021-09-16
Payer: COMMERCIAL

## 2021-09-16 DIAGNOSIS — F32.A DEPRESSION, UNSPECIFIED DEPRESSION TYPE: ICD-10-CM

## 2021-09-16 DIAGNOSIS — Z63.6 CAREGIVER STRESS: ICD-10-CM

## 2021-09-16 DIAGNOSIS — Z63.8 FAMILY CONFLICT: ICD-10-CM

## 2021-09-16 DIAGNOSIS — F43.23 ADJUSTMENT DISORDER WITH MIXED ANXIETY AND DEPRESSED MOOD: Primary | ICD-10-CM

## 2021-09-16 PROCEDURE — 90832 PSYTX W PT 30 MINUTES: CPT | Performed by: PSYCHOLOGIST

## 2021-09-16 SDOH — SOCIAL STABILITY - SOCIAL INSECURITY: OTHER SPECIFIED PROBLEMS RELATED TO PRIMARY SUPPORT GROUP: Z63.8

## 2021-09-16 SDOH — SOCIAL STABILITY - SOCIAL INSECURITY: DEPENDENT RELATIVE NEEDING CARE AT HOME: Z63.6

## 2021-09-16 ASSESSMENT — PATIENT HEALTH QUESTIONNAIRE - PHQ9
6. FEELING BAD ABOUT YOURSELF - OR THAT YOU ARE A FAILURE OR HAVE LET YOURSELF OR YOUR FAMILY DOWN: 0
SUM OF ALL RESPONSES TO PHQ QUESTIONS 1-9: 6
9. THOUGHTS THAT YOU WOULD BE BETTER OFF DEAD, OR OF HURTING YOURSELF: 0
SUM OF ALL RESPONSES TO PHQ QUESTIONS 1-9: 6
10. IF YOU CHECKED OFF ANY PROBLEMS, HOW DIFFICULT HAVE THESE PROBLEMS MADE IT FOR YOU TO DO YOUR WORK, TAKE CARE OF THINGS AT HOME, OR GET ALONG WITH OTHER PEOPLE: 1
5. POOR APPETITE OR OVEREATING: 0
SUM OF ALL RESPONSES TO PHQ9 QUESTIONS 1 & 2: 2
SUM OF ALL RESPONSES TO PHQ QUESTIONS 1-9: 6
7. TROUBLE CONCENTRATING ON THINGS, SUCH AS READING THE NEWSPAPER OR WATCHING TELEVISION: 2
8. MOVING OR SPEAKING SO SLOWLY THAT OTHER PEOPLE COULD HAVE NOTICED. OR THE OPPOSITE, BEING SO FIGETY OR RESTLESS THAT YOU HAVE BEEN MOVING AROUND A LOT MORE THAN USUAL: 0
2. FEELING DOWN, DEPRESSED OR HOPELESS: 1
4. FEELING TIRED OR HAVING LITTLE ENERGY: 0
3. TROUBLE FALLING OR STAYING ASLEEP: 2
1. LITTLE INTEREST OR PLEASURE IN DOING THINGS: 1

## 2021-09-16 NOTE — PATIENT INSTRUCTIONS
1. Communicate in a way to best be heard- Use those I statements    2. Consider that expectations exercise- are they met? If so- great! If not, do you demand they be met or adjust accordingly    3. Emotional accountability- I am not responsible for other people's emotions and behavior- I AM responsible for my own emotions and behaviors    4. Follow up as scheduled      I-Statements    Accepting responsibility for your feelings is one of the most important communication skills you can acquire. A good rule of thumb is: \"If you have a problem, make an I-Statement. Instead, we tend to express feelings and opinions without assuming responsibility for them. We tend to hide behind blaming others for making us feel the way we do, claiming \"it is responsible, or claiming \"we all feel this way. I-Statements consist of a description of how you feel, an indication of the conditions under which you feel that way, and why those conditions cause your emotions. I-Statements take this form: \"I feel [facility] your emotion) when .(describe their behavior or under what conditions you feel this way) because (explain why their behavior or the conditions cause you to feel this way). Clearly, giving an I-Statement is more constructive than commanding, threatening, moralizing, judging, ultimatums, mind-reading or other behaviors that create defensiveness. Benefits of I-Statements  *Avoids blaming others for your emotions  *Accurate and less hostile way to express a feeling or an emotion youre experiencing  *Most appropriate way to inform someone that their behavior is causing a problem  *Minimizes making the other person feel guilty, put-down, & resentful    I-Statements have Four Parts:    I feel__________________when________________because_________. Next time, Id appreciate it if___________________________________. 1.      Emotion: I feel (state your emotion):  It is a self-disclosure, referring to \"I\" and it expresses a feeling. The emotion or feeling must be expressed by saying, I feel       I feel [place] is not a statement of emotion        I feel like you.is not a statement of emotion                You make me feel blames the other for your emotion   It makes me feel blames it for your emotion    2. Behavior: Louvella Righter (describe their behavior or under what conditions you feel this way). Describe the other person's observable behavior or describe the conditions that are related to your feelings      State the facts objectively without opinions, assumptions, criticisms,commanding, threatening, moralizing, judging, ultimatums, mind-reading or other behaviors that create defensiveness          3. Why: Because (explain why those conditions or their behavior cause you to feel this way). Explain why you feel this emotion when the other person does that behavior or when you are under these conditions. The reason why you feel the way you do is often due to one or more of the following: How you interpret their behavior (intent or meaning)   The tangible & concrete effect their behavior has on you, them, or others (do not repeat your feelings). 4.  Offer an alternative: Next time (offer a solution or alternative to avoid this from happening again in the future). Provide options for alternative behaviors or responses that would be helpful if the situation occurs  again and sets clear expectations for future behavior. Examples of I-Statements                      1.      I feel annoyed (feelings) when clothes are left on the floor rather than put in the hamper (behavior), because then I have to pick them up to keep the house neat (why). Next time, I would appreciate it if you would put your dirty clothes directly in the hamper (alternative).                          2.      I feel angry (feelings) when dishes are left in the sink rather than put in the  (behavior), because I get the impression you expect me to clean-up after you and that is not my expectation of our relationship (why). Next time, I would appreciate it if you would put the dirty dishes in the  instead of the sink (alternative). 3.      I feel upset (feelings) when I am yelled at during a discussion (behavior) because I dont want to be in a relationship where yelling is the norm. Next time, I would appreciate it if you could not raise your voice at me.

## 2021-09-16 NOTE — PROGRESS NOTES
Behavioral Health Consultation  Jocelyn Gan, Ph.D., [facility]  Psychologist  9/16/21  1:00 PM EDT      Time spent with Patient: 30 minutes  This is patient's third  [facility] appointment. Reason for Consult:  depression, anxiety and stress  Referring Provider: JEANNA Mas      Feedback given to PCP. TELEHEALTH EVALUATION -- Audio and/or Visual (During SCNBP-88 public health emergency)    Due to COVID 19 outbreak, patient's office visit was converted to a virtual visit. Patient was contacted and agreed to proceed with a virtual visit via [employer]. Patient reports that they are located close to home. Provider Location [place] at her office in [place]. The risks and benefits of converting to a virtual visit were discussed in light of the current infectious disease epidemic. Patient (and/or legal guardian) also understood that insurance coverage and co-pays are up to their individual insurance plans. Patient (and/or legal guardian) provides verbal consent for this visit to be billed to insurance. Pursuant to the emergency declaration under the [facility], [facility] waiver authority and the [employer] Act, this Virtual  Visit was conducted, with patient's consent, to reduce the patient's risk of exposure to COVID-19 and provide continuity of care for an established patient. Services were provided through a video synchronous discussion virtually to substitute for in-person clinic visit. S:    Pt reports doing better over the past two weeks and reports feeling \"determined\" to create change in her life. Reviewed application of [facility] interventions, connection with chapter 6 book themes from the dance of anger. Pt provided an update on functioning, notes some major stress with father related to criticism, and how she has been able to respond to it.       Pt denies SI/HI    O:  MSE:    Appearance    alert, cooperative  Appetite normal  Sleep disturbance Yes  Fatigue No  Loss of pleasure No  Impulsive behavior at times  Speech    spontaneous, normal rate and normal volume  Mood    Mildly depressed  Affect    normal affect  Thought Content    intact  Thought Process    coherent  Associations    logical connections  Insight    Good  Judgment    Intact  Orientation    oriented to person, place, time, and general circumstances  Memory    recent and remote memory intact  Attention/Concentration    Intact in appt, reports as a concern  Morbid ideation No  Suicide Assessment    no suicidal ideation      History:    Medications:   Current Outpatient Medications   Medication Sig Dispense Refill    traZODone (DESYREL) 50 MG tablet Take 1 tablet by mouth nightly 90 tablet 0    PARoxetine (PAXIL) 40 MG tablet take 1 tablet by mouth every morning      busPIRone (BUSPAR) 15 MG tablet take 1 tablet by mouth three times a day       No current facility-administered medications for this visit. Social History:   Social History     Socioeconomic History    Marital status:      Spouse name: Not on file    Number of children: Not on file    Years of education: Not on file    Highest education level: Not on file   Occupational History    Not on file   Tobacco Use    Smoking status: Never Smoker    Smokeless tobacco: Never Used   Substance and Sexual Activity    Alcohol use: No    Drug use: Not on file    Sexual activity: Yes     Partners: Male   Other Topics Concern    Not on file   Social History Narrative    Not on file     Social Determinants of Health     Financial Resource Strain: Low Risk     Difficulty of Paying Living Expenses: Not hard at all   Food Insecurity: No Food Insecurity    Worried About Running Out of Food in the Last Year: Never true    [address] in the Last Year: Never true   Transportation Needs:     Lack of Transportation (Medical):      Lack of Transportation (Non-Medical):    Physical Activity:     Days of Exercise per Week:     Minutes of Exercise per Session:    Stress:     Feeling of Stress :    Social Connections:     Frequency of Communication with Friends and Family:     Frequency of Social Gatherings with Friends and Family:     Attends Mosque Services:     Active Member of Clubs or Organizations:     Attends Club or Organization Meetings:     Marital Status:    Intimate Partner Violence:     Fear of Current or Ex-Partner:     Emotionally Abused:     Physically Abused:     Sexually Abused:        TOBACCO:   reports that she has never smoked. She has never used smokeless tobacco.  ETOH:   reports no history of alcohol use. Family History:   Family History   Problem Relation Age of Onset    Breast Cancer Paternal Grandmother     Breast Cancer Paternal Aunt          A:  Administered the PHQ9 which indicates a self report of mild symptom distress. Pt would benefit from [facility] services to increase coping skills to provide symptom management/control/relief.        PHQ Scores 9/16/2021 8/31/2021 8/16/2021 8/2/2021   PHQ2 Score 2 4 6 3   PHQ9 Score 6 11 12 9     Interpretation of Total Score Depression Severity: 1-4 = Minimal depression, 5-9 = Mild depression, 10-14 = Moderate depression, 15-19 = Moderately severe depression, 20-27 = Severe depression      Diagnosis:    Adjustment disorder with mixed anxiety and depressed mood  Unspecified depressive disorder  R/O PDD vs MDD      Diagnosis Date    Osteopenia 2013           Plan:  Pt interventions:  Conducted functional assessment, [place]-setting to identify pt's primary goals for [facility] visit / overall health, Supportive techniques, Provided Psychoeducation re: communication, CBT to target cognitive restructuring, use of I statements, emotional accountability, expectations in relationships, Identified maladaptive thoughts and Emphasized importance of regular practice of relaxation strategies to target / promote symptom relief      Pt Behavioral Change Plan:    1. Communicate in a way to best be heard- Use those I statements    2. Consider that expectations exercise- are they met? If so- great! If not, do you demand they be met or adjust accordingly    3. Emotional accountability- I am not responsible for other people's emotions and behavior- I AM responsible for my own emotions and behaviors    4. Follow up as scheduled    Please note this report has been partially produced using speech recognition software  And may cause contain errors related to that system including grammar, punctuation and spelling as well as words and phrases that may seem inappropriate. If there are questions or concerns please feel free to contact me to clarify.

## 2021-09-28 ENCOUNTER — OFFICE VISIT (OUTPATIENT)
Dept: INTERNAL MEDICINE | Age: 56
End: 2021-09-28
Payer: COMMERCIAL

## 2021-09-28 VITALS
HEART RATE: 60 BPM | WEIGHT: 220 LBS | TEMPERATURE: 98 F | SYSTOLIC BLOOD PRESSURE: 120 MMHG | DIASTOLIC BLOOD PRESSURE: 70 MMHG | HEIGHT: 63 IN | BODY MASS INDEX: 38.98 KG/M2 | OXYGEN SATURATION: 97 %

## 2021-09-28 DIAGNOSIS — F43.89 ADJUSTMENT REACTION TO CHRONIC STRESS: Primary | ICD-10-CM

## 2021-09-28 PROCEDURE — 99213 OFFICE O/P EST LOW 20 MIN: CPT | Performed by: PHYSICIAN ASSISTANT

## 2021-09-28 PROCEDURE — 3017F COLORECTAL CA SCREEN DOC REV: CPT | Performed by: PHYSICIAN ASSISTANT

## 2021-09-28 PROCEDURE — G8417 CALC BMI ABV UP PARAM F/U: HCPCS | Performed by: PHYSICIAN ASSISTANT

## 2021-09-28 PROCEDURE — G8427 DOCREV CUR MEDS BY ELIG CLIN: HCPCS | Performed by: PHYSICIAN ASSISTANT

## 2021-09-28 PROCEDURE — 1036F TOBACCO NON-USER: CPT | Performed by: PHYSICIAN ASSISTANT

## 2021-09-28 RX ORDER — HYDROXYZINE HYDROCHLORIDE 25 MG/1
25 TABLET, FILM COATED ORAL EVERY 8 HOURS PRN
Qty: 90 TABLET | Refills: 0 | Status: SHIPPED | OUTPATIENT
Start: 2021-09-28 | End: 2021-10-28

## 2021-09-28 ASSESSMENT — ENCOUNTER SYMPTOMS: RESPIRATORY NEGATIVE: 1

## 2021-09-28 NOTE — PROGRESS NOTES
Herman Cha (: 1965) is a 60 y.o. female, Established patient, here for evaluation of the following chief complaint(s): Anxiety (Pt states she is having a really hard time dealing with her anxiety recently)        ASSESSMENT/PLAN:  1. Adjustment reaction to chronic stress  - continue current meds, added Atarax PRN   - hydrOXYzine (ATARAX) 25 MG tablet; Take 1 tablet by mouth every 8 hours as needed for Anxiety  Dispense: 90 tablet; Refill: 0  - relaxation  techniques advised       No follow-ups on file. SUBJECTIVE/OBJECTIVE:  HPI      Anxiety   Taking care of her dad, Xiomy Holmes that is very hard   She is taking Paxil. Buspar, an Trazodone , previously prescribed by Dr Wing Danielson that she is crashing, that she is trapped , although dong her breathing exercises    Seeing Dr Mauro Boudreaux       Review of Systems   Constitutional: Negative. HENT: Negative. Respiratory: Negative. Cardiovascular: Negative. Psychiatric/Behavioral: Positive for dysphoric mood. Negative for behavioral problems and decreased concentration. The patient is nervous/anxious. Physical Exam  Constitutional:       Appearance: Normal appearance. HENT:      Head: Normocephalic and atraumatic. Cardiovascular:      Pulses: Normal pulses. Heart sounds: Normal heart sounds. Pulmonary:      Effort: Pulmonary effort is normal.      Breath sounds: Normal breath sounds. Neurological:      Mental Status: She is alert. Vitals:    21 1530   BP: 120/70   Site: Left Upper Arm   Position: Sitting   Cuff Size: Large Adult   Pulse: 60   Temp: 98 °F (36.7 °C)   TempSrc: Temporal   SpO2: 97%   Weight: 220 lb (99.8 kg)   Height: 5' 3\" (1.6 m)                 An electronic signature was used to authenticate this note.     --JEANNA Schulz

## 2021-09-29 ENCOUNTER — VIRTUAL VISIT (OUTPATIENT)
Dept: BEHAVIORAL/MENTAL HEALTH CLINIC | Age: 56
End: 2021-09-29
Payer: COMMERCIAL

## 2021-09-29 DIAGNOSIS — F43.23 ADJUSTMENT DISORDER WITH MIXED ANXIETY AND DEPRESSED MOOD: Primary | ICD-10-CM

## 2021-09-29 DIAGNOSIS — Z63.8 FAMILY CONFLICT: ICD-10-CM

## 2021-09-29 DIAGNOSIS — Z63.6 CAREGIVER STRESS: ICD-10-CM

## 2021-09-29 DIAGNOSIS — F32.A DEPRESSION, UNSPECIFIED DEPRESSION TYPE: ICD-10-CM

## 2021-09-29 PROCEDURE — 90832 PSYTX W PT 30 MINUTES: CPT | Performed by: PSYCHOLOGIST

## 2021-09-29 SDOH — SOCIAL STABILITY - SOCIAL INSECURITY: DEPENDENT RELATIVE NEEDING CARE AT HOME: Z63.6

## 2021-09-29 SDOH — SOCIAL STABILITY - SOCIAL INSECURITY: OTHER SPECIFIED PROBLEMS RELATED TO PRIMARY SUPPORT GROUP: Z63.8

## 2021-09-29 ASSESSMENT — PATIENT HEALTH QUESTIONNAIRE - PHQ9
2. FEELING DOWN, DEPRESSED OR HOPELESS: 2
7. TROUBLE CONCENTRATING ON THINGS, SUCH AS READING THE NEWSPAPER OR WATCHING TELEVISION: 1
1. LITTLE INTEREST OR PLEASURE IN DOING THINGS: 1
9. THOUGHTS THAT YOU WOULD BE BETTER OFF DEAD, OR OF HURTING YOURSELF: 0
SUM OF ALL RESPONSES TO PHQ QUESTIONS 1-9: 10
4. FEELING TIRED OR HAVING LITTLE ENERGY: 1
5. POOR APPETITE OR OVEREATING: 0
6. FEELING BAD ABOUT YOURSELF - OR THAT YOU ARE A FAILURE OR HAVE LET YOURSELF OR YOUR FAMILY DOWN: 3
8. MOVING OR SPEAKING SO SLOWLY THAT OTHER PEOPLE COULD HAVE NOTICED. OR THE OPPOSITE, BEING SO FIGETY OR RESTLESS THAT YOU HAVE BEEN MOVING AROUND A LOT MORE THAN USUAL: 0
SUM OF ALL RESPONSES TO PHQ QUESTIONS 1-9: 10
SUM OF ALL RESPONSES TO PHQ9 QUESTIONS 1 & 2: 3
SUM OF ALL RESPONSES TO PHQ QUESTIONS 1-9: 10
3. TROUBLE FALLING OR STAYING ASLEEP: 2
10. IF YOU CHECKED OFF ANY PROBLEMS, HOW DIFFICULT HAVE THESE PROBLEMS MADE IT FOR YOU TO DO YOUR WORK, TAKE CARE OF THINGS AT HOME, OR GET ALONG WITH OTHER PEOPLE: 3

## 2021-09-29 NOTE — PROGRESS NOTES
Behavioral Health Consultation  Jocelyn Aguayo, Ph.D., [facility]  Psychologist  9/29/21  1:01 PM EDT      Time spent with Patient: 30 minutes  This is patient's fourth  [facility] appointment. Reason for Consult:  depression, anxiety and stress  Referring Provider: JEANNA Kyle    Feedback given to PCP. TELEHEALTH EVALUATION -- Audio and/or Visual (During MHRKH-71 public health emergency)    Due to COVID 19 outbreak, patient's office visit was converted to a virtual visit. Patient was contacted and agreed to proceed with a virtual visit via [employer]. Patient reports that they are located close to home. Provider Location is at her office in [place]. The risks and benefits of converting to a virtual visit were discussed in light of the current infectious disease epidemic. Patient (and/or legal guardian) also understood that insurance coverage and co-pays are up to their individual insurance plans. Patient (and/or legal guardian) provides verbal consent for this visit to be billed to insurance. Pursuant to the emergency declaration under the [facility], [facility] waiver authority and the [employer] and [employer] Act, this Virtual  Visit was conducted, with patient's consent, to reduce the patient's risk of exposure to COVID-19 and provide continuity of care for an established patient. Services were provided through a video synchronous discussion virtually to substitute for in-person clinic visit. S:      Pt reports increasing stress over the past week, leading to same day appt with PCP yesterday and new medication. Pt notes she took the hydroxyzine yesterday and found  It made it her very drowsy. Pt has been having interrupted sleep/nightmares on Trazodone. Pt notes some major changes to her stress related to  Her conflicted relationship with father.  Guilt related to considering not living with father, impact of some of father's cognitive decline, tough decision making. Pt denies SI/HI    O:  MSE:    Appearance    alert, cooperative  Appetite normal  Sleep disturbance Yes  Fatigue No  Loss of pleasure No  Impulsive behavior Yes at times  Speech    spontaneous, normal rate and normal volume  Mood    Depressed  Affect    depressed affect  Thought Content    intact  Thought Process    coherent  Associations    logical connections  Insight    Good  Judgment    Intact  Orientation    oriented to person, place, time, and general circumstances  Memory    recent and remote memory intact  Attention/Concentration    intact  Morbid ideation No  Suicide Assessment    no suicidal ideation      History:    Medications:   Current Outpatient Medications   Medication Sig Dispense Refill    hydrOXYzine (ATARAX) 25 MG tablet Take 1 tablet by mouth every 8 hours as needed for Anxiety 90 tablet 0    traZODone (DESYREL) 50 MG tablet Take 1 tablet by mouth nightly 90 tablet 0    PARoxetine (PAXIL) 40 MG tablet take 1 tablet by mouth every morning      busPIRone (BUSPAR) 15 MG tablet take 1 tablet by mouth three times a day       No current facility-administered medications for this visit.        Social History:   Social History     Socioeconomic History    Marital status:      Spouse name: Not on file    Number of children: Not on file    Years of education: Not on file    Highest education level: Not on file   Occupational History    Not on file   Tobacco Use    Smoking status: Never Smoker    Smokeless tobacco: Never Used   Substance and Sexual Activity    Alcohol use: No    Drug use: Not on file    Sexual activity: Yes     Partners: Male   Other Topics Concern    Not on file   Social History Narrative    Not on file     Social Determinants of Health     Financial Resource Strain: Low Risk     Difficulty of Paying Living Expenses: Not hard at all   Food Insecurity: No Food Insecurity    Worried About [address] in the Last Year: Never true    Ran Out of Food in the Last Year: Never true   Transportation Needs:     Lack of Transportation (Medical):  Lack of Transportation (Non-Medical):    Physical Activity:     Days of Exercise per Week:     Minutes of Exercise per Session:    Stress:     Feeling of Stress :    Social Connections:     Frequency of Communication with Friends and Family:     Frequency of Social Gatherings with Friends and Family:     Attends [employer] Services:     Active Member of Clubs or Organizations:     Attends Club or Organization Meetings:     Marital Status:    Intimate Partner Violence:     Fear of Current or Ex-Partner:     Emotionally Abused:     Physically Abused:     Sexually Abused:        TOBACCO:   reports that she has never smoked. She has never used smokeless tobacco.  ETOH:   reports no history of alcohol use. Family History:   Family History   Problem Relation Age of Onset    Breast Cancer Paternal Grandmother     Breast Cancer Paternal Aunt          A:  Administered the PHQ9 which indicates a self report of moderate symptom distress. Pt would benefit from [facility] services to increase coping skills to provide symptom management/control/relief.        PHQ Scores 9/29/2021 9/16/2021 8/31/2021 8/16/2021 8/2/2021   PHQ2 Score 3 2 4 6 3   PHQ9 Score 10 6 11 12 9     Interpretation of Total Score Depression Severity: 1-4 = Minimal depression, 5-9 = Mild depression, 10-14 = Moderate depression, 15-19 = Moderately severe depression, 20-27 = Severe depression      Diagnosis:    Adjustment disorder with mixed anxiety and depressed mood  Unspecified depressive disorder  R/O PDD vs MDD      Diagnosis Date    Osteopenia 2013           Plan:  Pt interventions:  Conducted functional assessment, [place]-setting to identify pt's primary goals for [facility] visit / overall health, Supportive techniques, Provided Psychoeducation re: decision making, CBT to target cognitive restructuring, wise mind

## 2021-09-29 NOTE — PATIENT INSTRUCTIONS
1. You can do everything right. .......... and he can still be critical    2. The people that have problems with you setting boundaries are the ones who benefit from you not having them. .... They have a different goal    3. Remember your Choice in the matter. You do have choices (you might not like all of them, but you do have them) and are not a prisoner    4. What am I willing to live with, what am I willing to live without. 5. May your choices reflect your hopes, not your fears. [address]. Guilt is not a healthy motivation. ..... 6. Decision making with the wise mind (see below)- feelings and facts , the right decision does not always feel good and the right decision does not always make perfect sense- but that doesn't mean it is not the right decision for you. 7. Follow up as scheduled     The Wise Mind Provided by [employer] © 2015    Your mind has three states: The reasonable mind, the emotional mind, and the wise mind. Everyone possesses each of these states, but most people gravitate toward a specific one most of the time. The emotional mind is  used when feelings  control a persons  thoughts and  behavior. They might  act impulsively with  little regard for  consequences. The wise mind refers to a  balance between the  reasonable and  emotional halves. They  are able to recognize and  respect their feelings,  while responding to them  in a rational manner. A person uses their  reasonable mind  when they approach  a situation  intellectually. They  plan and make  decisions based off of  Fact. Describe an experience youve had with each of the three states of mind.       Reasonable:       Emotional:       Wise:

## 2021-10-13 ENCOUNTER — VIRTUAL VISIT (OUTPATIENT)
Dept: BEHAVIORAL/MENTAL HEALTH CLINIC | Age: 56
End: 2021-10-13
Payer: COMMERCIAL

## 2021-10-13 DIAGNOSIS — Z63.6 CAREGIVER STRESS: ICD-10-CM

## 2021-10-13 DIAGNOSIS — F32.A DEPRESSION, UNSPECIFIED DEPRESSION TYPE: ICD-10-CM

## 2021-10-13 DIAGNOSIS — Z63.8 FAMILY CONFLICT: ICD-10-CM

## 2021-10-13 DIAGNOSIS — F43.23 ADJUSTMENT DISORDER WITH MIXED ANXIETY AND DEPRESSED MOOD: Primary | ICD-10-CM

## 2021-10-13 PROCEDURE — 90832 PSYTX W PT 30 MINUTES: CPT | Performed by: PSYCHOLOGIST

## 2021-10-13 SDOH — SOCIAL STABILITY - SOCIAL INSECURITY: DEPENDENT RELATIVE NEEDING CARE AT HOME: Z63.6

## 2021-10-13 SDOH — SOCIAL STABILITY - SOCIAL INSECURITY: OTHER SPECIFIED PROBLEMS RELATED TO PRIMARY SUPPORT GROUP: Z63.8

## 2021-10-13 ASSESSMENT — PATIENT HEALTH QUESTIONNAIRE - PHQ9
6. FEELING BAD ABOUT YOURSELF - OR THAT YOU ARE A FAILURE OR HAVE LET YOURSELF OR YOUR FAMILY DOWN: 1
5. POOR APPETITE OR OVEREATING: 0
SUM OF ALL RESPONSES TO PHQ9 QUESTIONS 1 & 2: 1
10. IF YOU CHECKED OFF ANY PROBLEMS, HOW DIFFICULT HAVE THESE PROBLEMS MADE IT FOR YOU TO DO YOUR WORK, TAKE CARE OF THINGS AT HOME, OR GET ALONG WITH OTHER PEOPLE: 1
SUM OF ALL RESPONSES TO PHQ QUESTIONS 1-9: 3
3. TROUBLE FALLING OR STAYING ASLEEP: 1
7. TROUBLE CONCENTRATING ON THINGS, SUCH AS READING THE NEWSPAPER OR WATCHING TELEVISION: 0
2. FEELING DOWN, DEPRESSED OR HOPELESS: 1
9. THOUGHTS THAT YOU WOULD BE BETTER OFF DEAD, OR OF HURTING YOURSELF: 0
1. LITTLE INTEREST OR PLEASURE IN DOING THINGS: 0
8. MOVING OR SPEAKING SO SLOWLY THAT OTHER PEOPLE COULD HAVE NOTICED. OR THE OPPOSITE, BEING SO FIGETY OR RESTLESS THAT YOU HAVE BEEN MOVING AROUND A LOT MORE THAN USUAL: 0
4. FEELING TIRED OR HAVING LITTLE ENERGY: 0
SUM OF ALL RESPONSES TO PHQ QUESTIONS 1-9: 3
SUM OF ALL RESPONSES TO PHQ QUESTIONS 1-9: 3

## 2021-10-13 NOTE — PATIENT INSTRUCTIONS
1. Make those asks as specific and measurable as possible    2. Acceptance- \"I'm not okay with it but it's going to be okay\". 3. Consider that rescuing behavior- hang up that superhero cape    4. Let's start with self talk! Speak to yourself the way that you would a friend. 5.  Watch for your use of comparison. Comparison is a great strategy for perspective UNLESS you lack self-compassion. 6.  There are three main components of self-compassion: acknowledging my own suffering (in the context of my life), remember that we all suffer, and practicing self-kindness. See below.     7. Follow up as scheduled

## 2021-10-13 NOTE — PROGRESS NOTES
Behavioral Health Consultation  Jocelyn Romero Child, Ph.D., [facility]  Psychologist  10/13/21  12:59 PM EDT      Time spent with Patient: 30 minutes  This is patient's fifth  [facility] appointment. Reason for Consult:  depression, anxiety and stress  Referring Provider: JEANNA Olivier      Feedback given to PCP. TELEHEALTH EVALUATION -- Audio and/or Visual (During RSZVL-99 public health emergency)    Due to COVID 19 outbreak, patient's office visit was converted to a virtual visit. Patient was contacted and agreed to proceed with a virtual visit via [employer]. Patient reports that they are located close to home. Provider Location is at her office in [place]. The risks and benefits of converting to a virtual visit were discussed in light of the current infectious disease epidemic. Patient (and/or legal guardian) also understood that insurance coverage and co-pays are up to their individual insurance plans. Patient (and/or legal guardian) provides verbal consent for this visit to be billed to insurance. Pursuant to the emergency declaration under the [facility] waiver authority and the [employer] and [employer] Act, this Virtual  Visit was conducted, with patient's consent, to reduce the patient's risk of exposure to COVID-19 and provide continuity of care for an established patient. Services were provided through a video synchronous discussion virtually to substitute for in-person clinic visit. S:      Pt reports doing \"good\", noting good benefit from doing the wise mind intervention. Pt detailed that experience, movement towards a decision. Explored communication with sisters related to support/involvement.         Pt denies SI/HI    O:  MSE:    Appearance    alert, cooperative  Appetite normal  Sleep disturbance No  Fatigue No  Loss of pleasure No  Impulsive behavior No  Speech    spontaneous, normal rate and normal volume  Mood appropriate  Affect    normal affect  Thought Content    intact  Thought Process    coherent  Associations    logical connections  Insight    Good  Judgment    Intact  Orientation    oriented to person, place, time, and general circumstances  Memory    recent and remote memory intact  Attention/Concentration    intact  Morbid ideation No  Suicide Assessment    no suicidal ideation      History:    Medications:   Current Outpatient Medications   Medication Sig Dispense Refill    hydrOXYzine (ATARAX) 25 MG tablet Take 1 tablet by mouth every 8 hours as needed for Anxiety 90 tablet 0    traZODone (DESYREL) 50 MG tablet Take 1 tablet by mouth nightly 90 tablet 0    PARoxetine (PAXIL) 40 MG tablet take 1 tablet by mouth every morning      busPIRone (BUSPAR) 15 MG tablet take 1 tablet by mouth three times a day       No current facility-administered medications for this visit. Social History:   Social History     Socioeconomic History    Marital status:      Spouse name: Not on file    Number of children: Not on file    Years of education: Not on file    Highest education level: Not on file   Occupational History    Not on file   Tobacco Use    Smoking status: Never Smoker    Smokeless tobacco: Never Used   Substance and Sexual Activity    Alcohol use: No    Drug use: Not on file    Sexual activity: Yes     Partners: Male   Other Topics Concern    Not on file   Social History Narrative    Not on file     Social Determinants of Health     Financial Resource Strain: Low Risk     Difficulty of Paying Living Expenses: Not hard at all   Food Insecurity: No Food Insecurity    Worried About Running Out of Food in the Last Year: Never true    [address] in the Last Year: Never true   Transportation Needs:     Lack of Transportation (Medical):      Lack of Transportation (Non-Medical):    Physical Activity:     Days of Exercise per Week:     Minutes of Exercise per Session:    Stress: Behavioral Change Plan:    1. Make those asks as specific and measurable as possible    2. Acceptance- \"I'm not okay with it but it's going to be okay\". 3. Consider that rescuing behavior- hang up that superhero cape    4. Let's start with self talk! Speak to yourself the way that you would a friend. 5.  Watch for your use of comparison. Comparison is a great strategy for perspective UNLESS you lack self-compassion. 6.  There are three main components of self-compassion: acknowledging my own suffering (in the context of my life), remember that we all suffer, and practicing self-kindness. See below. 7. Follow up as scheduled     Please note this report has been partially produced using speech recognition software  And may cause contain errors related to that system including grammar, punctuation and spelling as well as words and phrases that may seem inappropriate. If there are questions or concerns please feel free to contact me to clarify.

## 2021-11-04 ENCOUNTER — VIRTUAL VISIT (OUTPATIENT)
Dept: BEHAVIORAL/MENTAL HEALTH CLINIC | Age: 56
End: 2021-11-04
Payer: COMMERCIAL

## 2021-11-04 DIAGNOSIS — Z63.8 FAMILY CONFLICT: ICD-10-CM

## 2021-11-04 DIAGNOSIS — F32.A DEPRESSION, UNSPECIFIED DEPRESSION TYPE: ICD-10-CM

## 2021-11-04 DIAGNOSIS — F43.23 ADJUSTMENT DISORDER WITH MIXED ANXIETY AND DEPRESSED MOOD: Primary | ICD-10-CM

## 2021-11-04 DIAGNOSIS — Z63.6 CAREGIVER STRESS: ICD-10-CM

## 2021-11-04 PROCEDURE — 90832 PSYTX W PT 30 MINUTES: CPT | Performed by: PSYCHOLOGIST

## 2021-11-04 SDOH — SOCIAL STABILITY - SOCIAL INSECURITY: OTHER SPECIFIED PROBLEMS RELATED TO PRIMARY SUPPORT GROUP: Z63.8

## 2021-11-04 SDOH — SOCIAL STABILITY - SOCIAL INSECURITY: DEPENDENT RELATIVE NEEDING CARE AT HOME: Z63.6

## 2021-11-04 ASSESSMENT — PATIENT HEALTH QUESTIONNAIRE - PHQ9
8. MOVING OR SPEAKING SO SLOWLY THAT OTHER PEOPLE COULD HAVE NOTICED. OR THE OPPOSITE, BEING SO FIGETY OR RESTLESS THAT YOU HAVE BEEN MOVING AROUND A LOT MORE THAN USUAL: 0
4. FEELING TIRED OR HAVING LITTLE ENERGY: 0
1. LITTLE INTEREST OR PLEASURE IN DOING THINGS: 0
SUM OF ALL RESPONSES TO PHQ QUESTIONS 1-9: 3
SUM OF ALL RESPONSES TO PHQ QUESTIONS 1-9: 3
6. FEELING BAD ABOUT YOURSELF - OR THAT YOU ARE A FAILURE OR HAVE LET YOURSELF OR YOUR FAMILY DOWN: 1
2. FEELING DOWN, DEPRESSED OR HOPELESS: 1
7. TROUBLE CONCENTRATING ON THINGS, SUCH AS READING THE NEWSPAPER OR WATCHING TELEVISION: 0
SUM OF ALL RESPONSES TO PHQ9 QUESTIONS 1 & 2: 1
9. THOUGHTS THAT YOU WOULD BE BETTER OFF DEAD, OR OF HURTING YOURSELF: 0
3. TROUBLE FALLING OR STAYING ASLEEP: 1
10. IF YOU CHECKED OFF ANY PROBLEMS, HOW DIFFICULT HAVE THESE PROBLEMS MADE IT FOR YOU TO DO YOUR WORK, TAKE CARE OF THINGS AT HOME, OR GET ALONG WITH OTHER PEOPLE: 0
SUM OF ALL RESPONSES TO PHQ QUESTIONS 1-9: 3
5. POOR APPETITE OR OVEREATING: 0

## 2021-11-04 NOTE — PROGRESS NOTES
Behavioral Health Consultation  Jocelyn Israel, Ph.D., [facility]  Psychologist  11/4/21  1:01 PM EDT      Time spent with Patient: 30 minutes  This is patient's sixth  [facility] appointment. Reason for Consult:  depression, anxiety, stress and family conflict  Referring Provider: JEANNA Lamb      Feedback given to PCP. TELEHEALTH EVALUATION -- Audio and/or Visual (During PKEGH-24 public health emergency)    Due to COVID 19 outbreak, patient's office visit was converted to a virtual visit. Patient was contacted and agreed to proceed with a virtual visit via [employer]. Patient reports that they are located at home. Provider Location is at her office in [place]. The risks and benefits of converting to a virtual visit were discussed in light of the current infectious disease epidemic. Patient (and/or legal guardian) also understood that insurance coverage and co-pays are up to their individual insurance plans. Patient (and/or legal guardian) provides verbal consent for this visit to be billed to insurance. Pursuant to the emergency declaration under the [facility], [facility] waiver authority and the [employer] and [employer] Act, this Virtual  Visit was conducted, with patient's consent, to reduce the patient's risk of exposure to COVID-19 and provide continuity of care for an established patient. Services were provided through a video synchronous discussion virtually to substitute for in-person clinic visit. S:      Pt reports doing \"pretty good\" and reports feeling \"nervous\" related to a meeting with dad and his SO and pt's sisters tonight. Pt provided an update on fucntioning, communication with sisters, strategy to manage father's needs. Explored expectations for a meeting this tonight. Pt detailed some specific factors related to father's functioning. Explored conflict resolution, communication. Pt denies SI/HI. O:  MSE:    Appearance    alert, cooperative  Appetite normal  Sleep disturbance No  Fatigue No  Loss of pleasure No  Impulsive behavior at times  Speech    spontaneous, normal rate and normal volume  Mood    Appropriate  Affect    normal affect  Thought Content    intact  Thought Process    coherent  Associations    logical connections  Insight    Good  Judgment    Intact  Orientation    oriented to person, place, time, and general circumstances  Memory    recent and remote memory intact  Attention/Concentration    intact  Morbid ideation No  Suicide Assessment    no suicidal ideation      History:    Medications:   Current Outpatient Medications   Medication Sig Dispense Refill    traZODone (DESYREL) 50 MG tablet Take 1 tablet by mouth nightly 90 tablet 0    PARoxetine (PAXIL) 40 MG tablet take 1 tablet by mouth every morning      busPIRone (BUSPAR) 15 MG tablet take 1 tablet by mouth three times a day       No current facility-administered medications for this visit. Social History:   Social History     Socioeconomic History    Marital status:      Spouse name: Not on file    Number of children: Not on file    Years of education: Not on file    Highest education level: Not on file   Occupational History    Not on file   Tobacco Use    Smoking status: Never Smoker    Smokeless tobacco: Never Used   Substance and Sexual Activity    Alcohol use: No    Drug use: Not on file    Sexual activity: Yes     Partners: Male   Other Topics Concern    Not on file   Social History Narrative    Not on file     Social Determinants of Health     Financial Resource Strain: Low Risk     Difficulty of Paying Living Expenses: Not hard at all   Food Insecurity: No Food Insecurity    Worried About Running Out of Food in the Last Year: Never true    [address] in the Last Year: Never true   Transportation Needs:     Lack of Transportation (Medical):      Lack of Transportation (Non-Medical): Physical Activity:     Days of Exercise per Week:     Minutes of Exercise per Session:    Stress:     Feeling of Stress :    Social Connections:     Frequency of Communication with Friends and Family:     Frequency of Social Gatherings with Friends and Family:     Attends [employer] Services:     Active Member of Clubs or Organizations:     Attends Club or Organization Meetings:     Marital Status:    Intimate Partner Violence:     Fear of Current or Ex-Partner:     Emotionally Abused:     Physically Abused:     Sexually Abused:        TOBACCO:   reports that she has never smoked. She has never used smokeless tobacco.  ETOH:   reports no history of alcohol use. Family History:   Family History   Problem Relation Age of Onset    Breast Cancer Paternal Grandmother     Breast Cancer Paternal Aunt          A:  Administered the PHQ9 which indicates a self report of minimal symptom distress. Pt would benefit from [facility] services to increase coping skills to provide symptom management/control/relief.        PHQ Scores 11/4/2021 10/13/2021 9/29/2021 9/16/2021 8/31/2021 8/16/2021 8/2/2021   PHQ2 Score 1 1 3 2 4 6 3   PHQ9 Score 3 3 10 6 11 12 9     Interpretation of Total Score Depression Severity: 1-4 = Minimal depression, 5-9 = Mild depression, 10-14 = Moderate depression, 15-19 = Moderately severe depression, 20-27 = Severe depression      Diagnosis:    Adjustment disorder with mixed anxiety and depressed mood  Unspecified depressive disorder  R/O PDD vs MDD      Diagnosis Date    Osteopenia 2013           Plan:  Pt interventions:  Conducted functional assessment, [place]-setting to identify pt's primary goals for [facility] visit / overall health, Supportive techniques, Provided Psychoeducation re: conflict resolution, CBT to target cognitive restructuring, communication strategies, interpersonal relationships, can vs should, Identified maladaptive thoughts and Emphasized importance of regular practice of relaxation strategies to target / promote symptom relief      Pt Behavioral Change Plan:    1. When will you re-evaluate the plan? 2. Can vs should. .... there is a difference. .... Focus on safety    3. Clear, direct and concise    4. You can acknowledge someone else's perspective without agreeing with it    5. Use that Reflective statement before responding! 6. Follow up as scheduled       Please note this report has been partially produced using speech recognition software  And may cause contain errors related to that system including grammar, punctuation and spelling as well as words and phrases that may seem inappropriate. If there are questions or concerns please feel free to contact me to clarify.

## 2021-11-04 NOTE — PATIENT INSTRUCTIONS
1. When will you re-evaluate the plan? 2. Can vs should. .... there is a difference. .... Focus on safety    3. Clear, direct and concise    4. You can acknowledge someone else's perspective without agreeing with it    5. Use that Reflective statement before responding! 6. Follow up as scheduled           Communication Skills- Reflections  [employer] © 2015    Using a technique called reflection can quickly help you become a better listener. When  reflecting, you will repeat back what someone has just said to you, but in your own words. This shows that you didnt just hear the other person, but you are trying to understand them. Reflecting what another person says can feel funny at first. You might think the other person will be annoyed at you for repeating them. However, when used correctly, reflections receive a positive reaction and drive a conversation forward. Heres an example:  Speaker: I get so angry when you spend so much money without telling me. Were trying to save for a house! Listener: Were working hard to save for a house, so its really frustrating when it  seems like I dont care.     Quick Tips  The tone of voice you use for reflections is important. Use a tone that comes across as a  statement, with a bit of uncertainty. Your goal is to express: I think this is what youre telling me, but correct me if Im wrong.  Your reflections dont have to be perfect. If the other person corrects you, thats good! Now you have a better understanding of what theyre trying to say. Try to reflect emotions, even if the person youre listening to didnt clearly describe them. You may be able to  on how they feel by their tone of voice or body language. Switch up your phrasing, or your reflections will start to sound forced. Try some of these:  I hear you saying that  It sounds like you feel  Youre telling me that    Focus on reflecting the main point.  Dont worry too much about all the little details, especially if the speaker had a lot to say! Practice    I was in a bad mood yesterday because work has been so stressful. I just cant  keep up with everything I have to do.       Reflection:_____________________________________________________________    I feel like Im doing all of the work around the house. I need you to help me clean  and do the dishes more often.       Reflection:_____________________________________________________________      Iza Lars been worried when you dont answer your phone. I always think something  mightve happened to you.       Reflection:_____________________________________________________________      Fozia Magalie dont understand what she wants from me. First she says she wants one thing,  then another.     Reflection:_____________________________________________________________

## 2021-11-18 ENCOUNTER — VIRTUAL VISIT (OUTPATIENT)
Dept: BEHAVIORAL/MENTAL HEALTH CLINIC | Age: 56
End: 2021-11-18
Payer: COMMERCIAL

## 2021-11-18 DIAGNOSIS — Z63.6 CAREGIVER STRESS: ICD-10-CM

## 2021-11-18 DIAGNOSIS — F32.A DEPRESSION, UNSPECIFIED DEPRESSION TYPE: ICD-10-CM

## 2021-11-18 DIAGNOSIS — Z63.8 FAMILY CONFLICT: ICD-10-CM

## 2021-11-18 DIAGNOSIS — F43.23 ADJUSTMENT DISORDER WITH MIXED ANXIETY AND DEPRESSED MOOD: Primary | ICD-10-CM

## 2021-11-18 PROCEDURE — 1036F TOBACCO NON-USER: CPT | Performed by: PSYCHOLOGIST

## 2021-11-18 PROCEDURE — 90832 PSYTX W PT 30 MINUTES: CPT | Performed by: PSYCHOLOGIST

## 2021-11-18 SDOH — SOCIAL STABILITY - SOCIAL INSECURITY: OTHER SPECIFIED PROBLEMS RELATED TO PRIMARY SUPPORT GROUP: Z63.8

## 2021-11-18 SDOH — SOCIAL STABILITY - SOCIAL INSECURITY: DEPENDENT RELATIVE NEEDING CARE AT HOME: Z63.6

## 2021-11-18 ASSESSMENT — PATIENT HEALTH QUESTIONNAIRE - PHQ9
10. IF YOU CHECKED OFF ANY PROBLEMS, HOW DIFFICULT HAVE THESE PROBLEMS MADE IT FOR YOU TO DO YOUR WORK, TAKE CARE OF THINGS AT HOME, OR GET ALONG WITH OTHER PEOPLE: 0
5. POOR APPETITE OR OVEREATING: 0
8. MOVING OR SPEAKING SO SLOWLY THAT OTHER PEOPLE COULD HAVE NOTICED. OR THE OPPOSITE, BEING SO FIGETY OR RESTLESS THAT YOU HAVE BEEN MOVING AROUND A LOT MORE THAN USUAL: 0
4. FEELING TIRED OR HAVING LITTLE ENERGY: 0
2. FEELING DOWN, DEPRESSED OR HOPELESS: 0
SUM OF ALL RESPONSES TO PHQ9 QUESTIONS 1 & 2: 0
3. TROUBLE FALLING OR STAYING ASLEEP: 0
1. LITTLE INTEREST OR PLEASURE IN DOING THINGS: 0
SUM OF ALL RESPONSES TO PHQ QUESTIONS 1-9: 0
7. TROUBLE CONCENTRATING ON THINGS, SUCH AS READING THE NEWSPAPER OR WATCHING TELEVISION: 0
9. THOUGHTS THAT YOU WOULD BE BETTER OFF DEAD, OR OF HURTING YOURSELF: 0
SUM OF ALL RESPONSES TO PHQ QUESTIONS 1-9: 0
SUM OF ALL RESPONSES TO PHQ QUESTIONS 1-9: 0
6. FEELING BAD ABOUT YOURSELF - OR THAT YOU ARE A FAILURE OR HAVE LET YOURSELF OR YOUR FAMILY DOWN: 0

## 2021-11-18 NOTE — Clinical Note
Pt has met her [facility] [place] goals!  [facility]

## 2021-11-18 NOTE — PROGRESS NOTES
Behavioral Health Consultation  Jocelyn Cronin, Ph.D., [facility]  Psychologist  11/18/21  1:02 PM EST      Time spent with Patient: 30 minutes  This is patient's seventh  [facility] appointment. Reason for Consult:  depression, anxiety, stress and family conflict  Referring Provider: JEANNA Aleman      Feedback given to PCP. TELEHEALTH EVALUATION -- Audio and/or Visual (During PPUDA-40 public health emergency)    Due to COVID 19 outbreak, patient's office visit was converted to a virtual visit. Patient was contacted and agreed to proceed with a virtual visit via [employer]. Patient reports that they are located at home. Provider Location is at her office in [address] [place] [address]. The risks and benefits of converting to a virtual visit were discussed in light of the current infectious disease epidemic. Patient (and/or legal guardian) also understood that insurance coverage and co-pays are up to their individual insurance plans. Patient (and/or legal guardian) provides verbal consent for this visit to be billed to insurance. Pursuant to the emergency declaration under the [facility] waiver authority and the [employer] and [employer] Act, this Virtual  Visit was conducted, with patient's consent, to reduce the patient's risk of exposure to COVID-19 and provide continuity of care for an established patient. Services were provided through a video synchronous discussion virtually to substitute for in-person clinic visit. S:      Pt reports things have been improving. Reviewed productive family meetings, expectations, communication. Pt notes an incredible improvement in the tension in the home. Reviewed active coping, goals being met.       Pt denies SI/HI    O:  MSE:    Appearance    alert, cooperative  Appetite normal  Sleep disturbance No  Fatigue No  Loss of pleasure No  Impulsive behavior No  Speech    spontaneous, normal rate and normal volume  Mood    Appropriate  Affect    normal affect  Thought Content    intact  Thought Process    coherent  Associations    logical connections  Insight    Good  Judgment    Intact  Orientation    oriented to person, place, time, and general circumstances  Memory    recent and remote memory intact  Attention/Concentration    intact  Morbid ideation No  Suicide Assessment    no suicidal ideation      History:    Medications:   Current Outpatient Medications   Medication Sig Dispense Refill    traZODone (DESYREL) 50 MG tablet Take 1 tablet by mouth nightly 90 tablet 0    PARoxetine (PAXIL) 40 MG tablet take 1 tablet by mouth every morning      busPIRone (BUSPAR) 15 MG tablet take 1 tablet by mouth three times a day       No current facility-administered medications for this visit. Social History:   Social History     Socioeconomic History    Marital status:      Spouse name: Not on file    Number of children: Not on file    Years of education: Not on file    Highest education level: Not on file   Occupational History    Not on file   Tobacco Use    Smoking status: Never Smoker    Smokeless tobacco: Never Used   Substance and Sexual Activity    Alcohol use: No    Drug use: Not on file    Sexual activity: Yes     Partners: Male   Other Topics Concern    Not on file   Social History Narrative    Not on file     Social Determinants of Health     Financial Resource Strain: Low Risk     Difficulty of Paying Living Expenses: Not hard at all   Food Insecurity: No Food Insecurity    Worried About Running Out of Food in the Last Year: Never true    [address] in the Last Year: Never true   Transportation Needs:     Lack of Transportation (Medical): Not on file    Lack of Transportation (Non-Medical):  Not on file   Physical Activity:     Days of Exercise per Week: Not on file    Minutes of Exercise per Session: Not on file   Stress:     Feeling of Stress : Not on file   Social Connections:     Frequency of Communication with Friends and Family: Not on file    Frequency of Social Gatherings with Friends and Family: Not on file    Attends Church Services: Not on file    Active Member of Clubs or Organizations: Not on file    Attends Club or Organization Meetings: Not on file    Marital Status: Not on file   Intimate Partner Violence:     Fear of Current or Ex-Partner: Not on file    Emotionally Abused: Not on file    Physically Abused: Not on file    Sexually Abused: Not on file   Housing Stability:     Unable to Pay for Housing in the Last Year: Not on file    Number of [place] in the Last Year: Not on file    Unstable Housing in the Last Year: Not on file       TOBACCO:   reports that she has never smoked. She has never used smokeless tobacco.  ETOH:   reports no history of alcohol use. Family History:   Family History   Problem Relation Age of Onset    Breast Cancer Paternal Grandmother     Breast Cancer Paternal Aunt          A:  Administered the PHQ9 which indicates a self report of no symptom distress. Pt's [facility] goals have been met, is terminated from services but able to return if she'd ever like to re-establish with [facility] services to increase coping skills to provide symptom management/control/relief.        PHQ Scores 11/18/2021 11/4/2021 10/13/2021 9/29/2021 9/16/2021 8/31/2021 8/16/2021   PHQ2 Score 0 1 1 3 2 4 6   PHQ9 Score 0 3 3 10 6 11 12     Interpretation of Total Score Depression Severity: 1-4 = Minimal depression, 5-9 = Mild depression, 10-14 = Moderate depression, 15-19 = Moderately severe depression, 20-27 = Severe depression      Diagnosis:    Adjustment disorder with mixed anxiety and depressed mood  Unspecified depressive disorder  R/O PDD vs MDD      Diagnosis Date    Osteopenia 2013           Plan:  Pt interventions:  Conducted functional assessment, [place]-setting to identify pt's primary goals for [facility] visit / overall health, Supportive techniques, Provided Psychoeducation re: healthy relationships, CBT to target cognitive restructuring, communication, conflict resolution, Identified maladaptive thoughts and Emphasized importance of regular practice of relaxation strategies to target / promote symptom relief      Pt Behavioral Change Plan:    1. A respectful, healthy relationship rather than \"friendship\"    2. No matter what it is , if you can talk about it, then there is potential to improve or resolve it. 3. Hash it out vs walk away strategy    4. See below for the rules to fight fair. ..    5. Follow up as needed    Please note this report has been partially produced using speech recognition software  And may cause contain errors related to that system including grammar, punctuation and spelling as well as words and phrases that may seem inappropriate. If there are questions or concerns please feel free to contact me to clarify.

## 2021-11-18 NOTE — PATIENT INSTRUCTIONS
1.  A respectful, healthy relationship rather than \"friendship\"    2. No matter what it is , if you can talk about it, then there is potential to improve or resolve it. 3. Hash it out vs walk away strategy    4. See below for the rules to fight fair. ..    5. Follow up as needed      Relationship Conflict Resolution Provided by [employer] © 2013    Focus on the problem, not the person. When a disagreement turns to personal insults, raised voices, or mocking tones, the conversation is no longer productive. Be careful to focus on the problem without placing blame on your partner. If a disagreement becomes personal, you should pause the conversation. Use reflective listening. Oftentimes during arguments we focus on getting our own point across rather than listening to our partner. Before responding to your partner, restate what they have said to you in your own words. Continue this process until your partner agrees that you understand. Next, share your side. Your partner should reflect back your ideas in their own words until they too understand. Using this technique will help both individuals feel listened to and understood, even if you disagree. Use I statements. When sharing a concern, begin your sentence with I. For example: I feel hurt when I am not informed that youll be late. With this sentence format we show that we are taking responsibility for our own emotion rather than blaming our partner. The alternative sentenceYou never tell me when youre going to be latewill often cause a partner to become defensive. Know when to take a time-out. When you and your partner are becoming argumentative, insulting, or aggressive, its a good idea to take a time-out. Have a plan in place so you or your partner can call for a break when needed. Spend some time doing something alone that you find relaxing. When youve both calmed down, you and your partner can return to solving the problem.  Be sure that you do returnit isnt a good idea to leave these issues unaddressed. Work toward a resolution. Disagreement is a normal part of a relationship. If it becomes clear that you and your partner will not agree, focus on a resolution instead. Try to find a compromise that benefits both individuals. Ask yourself if this disagreement really matters to your relationship, and let yourself move on if not.    ----------------------------------------------------------------------------------------------------------------------  Fair Fighting Rules    Provided by [employer] © 2014    Before you begin, ask yourself why you feel upset. Are you truly angry because your partner left the mustard on the counter? Or are you upset because you feel like youre doing an uneven share of the housework, and this is just one more piece of evidence? Take time to think about your own feelings before starting an argument. Discuss one issue at a time. You shouldnt be spending so much money without talking to me can quickly turn into You dont care about our family. Now you need to resolve two problems instead of one. Plus, when an argument starts to get off topic, it can easily become about everything a person has ever done wrong. Weve all done a lot wrong, so this can be especially cumbersome. No degrading language. Discuss the issue, not the person. No put-downs, swearing, or name-calling. Degrading language is an attempt to express negative feelings while making sure your partner feels just as bad. This will just lead to more character attacks while the original issue is forgotten. Express your feelings with words and take responsibility for them. I feel angry.  I feel hurt when you ignore my phone calls.  I feel scared when you yell.  These are good ways to express how you feel.  Starting with I is a good technique to help you take responsibility for your feelings (no, you cant say whatever you want

## 2021-11-25 ENCOUNTER — HOSPITAL ENCOUNTER (EMERGENCY)
Age: 56
Discharge: HOME | End: 2021-11-25
Attending: EMERGENCY MEDICINE
Payer: COMMERCIAL

## 2021-11-25 ENCOUNTER — APPOINTMENT (OUTPATIENT)
Dept: GENERAL RADIOLOGY | Age: 56
End: 2021-11-25
Payer: COMMERCIAL

## 2021-11-25 VITALS
DIASTOLIC BLOOD PRESSURE: 95 MMHG | BODY MASS INDEX: 39.56 KG/M2 | HEIGHT: 62 IN | TEMPERATURE: 98.1 F | SYSTOLIC BLOOD PRESSURE: 130 MMHG | RESPIRATION RATE: 16 BRPM | OXYGEN SATURATION: 97 % | WEIGHT: 215 LBS | HEART RATE: 67 BPM

## 2021-11-25 DIAGNOSIS — S82.891A CLOSED FRACTURE OF RIGHT ANKLE, INITIAL ENCOUNTER: Primary | ICD-10-CM

## 2021-11-25 PROCEDURE — 73610 X-RAY EXAM OF ANKLE: CPT

## 2021-11-25 PROCEDURE — 99285 EMERGENCY DEPT VISIT HI MDM: CPT

## 2021-11-25 ASSESSMENT — PAIN DESCRIPTION - PAIN TYPE
TYPE: ACUTE PAIN
TYPE: ACUTE PAIN

## 2021-11-25 ASSESSMENT — PAIN DESCRIPTION - ORIENTATION
ORIENTATION: RIGHT
ORIENTATION: RIGHT

## 2021-11-25 ASSESSMENT — PAIN SCALES - GENERAL
PAINLEVEL_OUTOF10: 6
PAINLEVEL_OUTOF10: 5

## 2021-11-25 ASSESSMENT — PAIN DESCRIPTION - ONSET: ONSET: SUDDEN

## 2021-11-25 ASSESSMENT — PAIN DESCRIPTION - LOCATION
LOCATION: ANKLE
LOCATION: ANKLE

## 2021-11-25 ASSESSMENT — PAIN DESCRIPTION - FREQUENCY
FREQUENCY: CONTINUOUS
FREQUENCY: CONTINUOUS

## 2021-11-25 ASSESSMENT — PAIN DESCRIPTION - DESCRIPTORS
DESCRIPTORS: TIGHTNESS;ACHING
DESCRIPTORS: ACHING

## 2021-11-25 ASSESSMENT — PAIN DESCRIPTION - PROGRESSION: CLINICAL_PROGRESSION: NOT CHANGED

## 2021-11-25 NOTE — ED TRIAGE NOTES
Patient presents to ED with c/o right ankle pain and swelling that started yesterday after a fall while hanging nasim lights.  She denies any other injury and loc

## 2021-11-25 NOTE — ED PROVIDER NOTES
CC/HPI: 70-year-old female to the emergency department chief complaint of right ankle injury. Patient states she rolled it yesterday and has pain and swelling mostly about the lateral posterior aspect. Patient states she has rolled her ankle both of them before. No other injury no paresthesias. Patient states she is able to bear weight. VITALS/PMH/PSH: Reviewed per nurses notes    REVIEW OF SYSTEMS: As in chief complaint history of present illness, otherwise all other systems are reviewed and negative the total 10 systems reviewed    PHYSICAL EXAM:  GEN: Pt alert and oriented, no acute distress  HEENT:         Normocephalic/Atramatic   NECK: Nontender, no signs of trauma, no lymphadenopathy  HEART: Reg S1/S2, without murmer, rub or gallop  LUNGS: Clear to auscultation bilaterally, respirations even and unlabored  MUSCULOSKELETAL/EXTREMITITES:  No other signs of trauma, cyanosis or edema. Examination of the right ankle shows no gross deformity. There is mild swelling about the lateral malleolus with localized tenderness to palpation there and slightly about the Achilles. Achilles his appears to be intact. No tenderness throughout foot or proximal fibula. Neurovascularly intact distally. LYMPH: no peripheral lympadenopathy noted  SKIN:  Warm & dry, no rash  NEUROLOGIC:  Alert and oriented. Speech clear    Medical decision making/ED course;  X-rays were obtained and interpreted by me as showing a questionable tiny avulsion fracture at the tip of the distal fibula. Otherwise no obvious signs of trauma other than soft tissue swelling. Discussed with patient    Clinical impression;  1) avulsion fracture right ankle    Disposition/plan; patient being discharged home in stable condition given discharge instructions on ankle fracture. Patient to follow-up with orthopedics early next week. Return for worsening or changes to symptoms.  Patient states she has a four-point walker at home     Curtis Flores DO  11/25/21 [facility], DO  11/25/21 [id number]

## 2021-12-30 ENCOUNTER — VIRTUAL VISIT (OUTPATIENT)
Dept: INTERNAL MEDICINE | Age: 56
End: 2021-12-30
Payer: COMMERCIAL

## 2021-12-30 DIAGNOSIS — R09.81 NASAL CONGESTION: Primary | ICD-10-CM

## 2021-12-30 DIAGNOSIS — R42 VERTIGO: ICD-10-CM

## 2021-12-30 DIAGNOSIS — B34.9 VIRAL ILLNESS: ICD-10-CM

## 2021-12-30 LAB
INFLUENZA A ANTIBODY: NORMAL
INFLUENZA B ANTIBODY: NORMAL
Lab: NORMAL
PERFORMING INSTRUMENT: NORMAL
QC PASS/FAIL: NORMAL
S PYO AG THROAT QL: NORMAL
SARS-COV-2, POC: NORMAL

## 2021-12-30 PROCEDURE — 99213 OFFICE O/P EST LOW 20 MIN: CPT | Performed by: NURSE PRACTITIONER

## 2021-12-30 PROCEDURE — 87880 STREP A ASSAY W/OPTIC: CPT | Performed by: NURSE PRACTITIONER

## 2021-12-30 PROCEDURE — 87426 SARSCOV CORONAVIRUS AG IA: CPT | Performed by: NURSE PRACTITIONER

## 2021-12-30 PROCEDURE — 87804 INFLUENZA ASSAY W/OPTIC: CPT | Performed by: NURSE PRACTITIONER

## 2021-12-30 PROCEDURE — G8427 DOCREV CUR MEDS BY ELIG CLIN: HCPCS | Performed by: NURSE PRACTITIONER

## 2021-12-30 PROCEDURE — 3017F COLORECTAL CA SCREEN DOC REV: CPT | Performed by: NURSE PRACTITIONER

## 2021-12-30 RX ORDER — CETIRIZINE HYDROCHLORIDE 10 MG/1
10 TABLET ORAL DAILY
Qty: 30 TABLET | Refills: 0 | Status: SHIPPED | OUTPATIENT
Start: 2021-12-30 | End: 2022-01-29

## 2021-12-30 RX ORDER — FLUTICASONE PROPIONATE 50 MCG
1 SPRAY, SUSPENSION (ML) NASAL DAILY
Qty: 1 EACH | Refills: 1 | Status: SHIPPED | OUTPATIENT
Start: 2021-12-30

## 2021-12-30 ASSESSMENT — ENCOUNTER SYMPTOMS
SHORTNESS OF BREATH: 1
SINUS PRESSURE: 1
WHEEZING: 0
RHINORRHEA: 1
CHEST TIGHTNESS: 0
ABDOMINAL PAIN: 0
SORE THROAT: 0
DIARRHEA: 0
NAUSEA: 0
COUGH: 1
VOMITING: 0
BACK PAIN: 0

## 2021-12-30 NOTE — PROGRESS NOTES
Cassia Adhikari (:  1965) is a 60 y.o. female, Established patient, here for evaluation of the following chief complaint(s):  Concern For COVID-19 (pos exposure, symptoms include dizziness, nausea and vomiting, sore throat chills and sweats, stuffy nose, cough with sob and wheezing but has hx of wheezing. )      Patient-Reported Vitals 2021   Patient-Reported Weight 220   Patient-Reported Height 52   Patient-Reported Systolic 719   Patient-Reported Diastolic 80   Patient-Reported Pulse 67         ASSESSMENT/PLAN:  1. Nasal congestion  -     cetirizine (ZYRTEC) 10 MG tablet; Take 1 tablet by mouth daily, Disp-30 tablet, R-0Normal  -     fluticasone (FLONASE) 50 MCG/ACT nasal spray; 1 spray by Nasal route daily, Disp-1 each, R-1Normal  2. Vertigo         -   Zyrtec  3. Viral illness  -     POCT Influenza A/B  -     POCT COVID-19, Antigen  -     POCT rapid strep A                            NEG for all      Return if symptoms worsen or fail to improve. SUBJECTIVE/OBJECTIVE:    HPI    Symptoms started   Exposed to covid+ on   Dizziness, room spins  Ears clogged  Nausea  Vomiting  Sore throat  Chills  Sweats  Stuffy nose  Cough  Shortness of breath  Wheezing (has hx of wheezing)  OTC mucinex  Drinking water      Review of Systems   Constitutional: Positive for chills and diaphoresis. Negative for fatigue and fever. HENT: Positive for congestion, ear pain (clogged/pressure), postnasal drip, rhinorrhea and sinus pressure. Negative for sore throat. Respiratory: Positive for cough and shortness of breath. Negative for chest tightness and wheezing. Cardiovascular: Negative for chest pain, palpitations and leg swelling. Gastrointestinal: Negative for abdominal pain, diarrhea, nausea and vomiting. Musculoskeletal: Negative for arthralgias, back pain and myalgias. Neurological: Positive for dizziness and headaches. Negative for light-headedness.          Physical Exam  PHYSICAL EXAMINATION:  [ INSTRUCTIONS:  \"[x]\" Indicates a positive item  \"[]\" Indicates a negative item  -- DELETE ALL ITEMS NOT EXAMINED]    [x] Alert  [x] Oriented to person/place/time      [x] No apparent distress      [x] Breathing appears normal      [x] Normal Mood      [] Poor short term memory  [] Poor long term memory    [] OTHER:      Due to this being a TeleHealth encounter, evaluation of the following organ systems is limited: Vitals/Constitutional/EENT/Resp/CV/GI//MS/Neuro/Skin/Heme-Lymph-Imm. On this date 12/30/2021 I have spent 11 minutes reviewing previous notes, test results and face to face (virtual) with the patient discussing the diagnosis and importance of compliance with the treatment plan as well as documenting on the day of the visit. Maxime Mello is a 60 y.o. female being evaluated by a Virtual Visit (video visit) encounter to address concerns as mentioned above. A caregiver was present when appropriate. Due to this being a TeleHealth encounter (During QXSKG-49 public health emergency), evaluation of the following organ systems was limited: Vitals/Constitutional/EENT/Resp/CV/GI//MS/Neuro/Skin/Heme-Lymph-Imm. Pursuant to the emergency declaration under the [address] [place] [address] and the [employer] and [employer] Act, this Virtual Visit was conducted with patient's (and/or legal guardian's) consent, to reduce the patient's risk of exposure to COVID-19 and provide necessary medical care. The patient (and/or legal guardian) has also been advised to contact this office for worsening conditions or problems, and seek emergency medical treatment and/or call 911 if deemed necessary. Patient identification was verified at the start of the visit: Yes    Services were provided through a video synchronous discussion virtually to substitute for in-person clinic visit.  Patient was located at home and provider was located in office or at home. An electronic signature was used to authenticate this note.     --ROSANNE Isaacs

## 2022-01-02 LAB — THROAT CULTURE: NORMAL

## 2022-01-03 ENCOUNTER — NURSE ONLY (OUTPATIENT)
Dept: INTERNAL MEDICINE | Age: 57
End: 2022-01-03

## 2022-01-03 DIAGNOSIS — Z20.822 SUSPECTED COVID-19 VIRUS INFECTION: ICD-10-CM

## 2022-01-03 DIAGNOSIS — Z20.822 COVID-19 RULED OUT: Primary | ICD-10-CM

## 2022-01-03 DIAGNOSIS — Z20.822 SUSPECTED COVID-19 VIRUS INFECTION: Primary | ICD-10-CM

## 2022-01-05 LAB
SARS-COV-2: DETECTED
SOURCE: ABNORMAL

## 2022-01-07 ENCOUNTER — TELEPHONE (OUTPATIENT)
Dept: INTERNAL MEDICINE | Age: 57
End: 2022-01-07

## 2022-01-07 DIAGNOSIS — U07.1 COVID-19: Primary | ICD-10-CM

## 2022-01-07 RX ORDER — AZITHROMYCIN 250 MG/1
TABLET, FILM COATED ORAL
Qty: 6 TABLET | Refills: 0 | Status: SHIPPED | OUTPATIENT
Start: 2022-01-07 | End: 2022-07-11 | Stop reason: ALTCHOICE

## 2022-01-07 RX ORDER — METHYLPREDNISOLONE 4 MG/1
TABLET ORAL
Qty: 1 KIT | Refills: 0 | Status: SHIPPED | OUTPATIENT
Start: 2022-01-07 | End: 2022-01-13

## 2022-01-07 NOTE — TELEPHONE ENCOUNTER
covid can do this, and abx don't usually help.   I will send any ways, along with a steroid that will help

## 2022-01-07 NOTE — TELEPHONE ENCOUNTER
Pt called to say she has had same symptoms since 12/27/21. She states she is still hacking and coughing, just wore out. Work will not let her return with symptoms. Zyrtec and flonase not helping.  She would like to know if she can get an abx, believes this may have turned into bacterial. Pharmacy [facility]

## 2022-01-10 NOTE — TELEPHONE ENCOUNTER
Pt states she is almost done w/ the meds, says that 3 days into it the ABX isn't helping w/ the chest congestion and lungs.

## 2022-03-02 RX ORDER — BUSPIRONE HYDROCHLORIDE 15 MG/1
TABLET ORAL
Qty: 90 TABLET | OUTPATIENT
Start: 2022-03-02

## 2022-05-02 RX ORDER — BUSPIRONE HYDROCHLORIDE 15 MG/1
TABLET ORAL
Qty: 90 TABLET | Refills: 1 | Status: SHIPPED | OUTPATIENT
Start: 2022-05-02 | End: 2022-06-27

## 2022-05-02 NOTE — TELEPHONE ENCOUNTER
Comments:     Last Office Visit (last PCP visit):   9/28/2021    Next Visit Date:  No future appointments. **If hasn't been seen in over a year OR hasn't followed up according to last diabetes/ADHD visit, make appointment for patient before sending refill to provider.     Rx requested:  Requested Prescriptions     Pending Prescriptions Disp Refills    busPIRone (BUSPAR) 15 MG tablet [Pharmacy Med Name: BUSPIRONE HCL 15 MG TABLET] 90 tablet 1     Sig: take 1 tablet by mouth three times a day

## 2022-05-06 ENCOUNTER — PATIENT MESSAGE (OUTPATIENT)
Dept: INTERNAL MEDICINE | Age: 57
End: 2022-05-06

## 2022-05-06 DIAGNOSIS — F43.23 ADJUSTMENT DISORDER WITH MIXED ANXIETY AND DEPRESSED MOOD: Primary | ICD-10-CM

## 2022-05-06 RX ORDER — TRAZODONE HYDROCHLORIDE 50 MG/1
50 TABLET ORAL NIGHTLY
Qty: 90 TABLET | Refills: 1 | Status: SHIPPED | OUTPATIENT
Start: 2022-05-06 | End: 2022-07-12 | Stop reason: SDUPTHER

## 2022-05-06 NOTE — TELEPHONE ENCOUNTER
Comments:     Last Office Visit (last PCP visit):   9/28/2021    Next Visit Date:  No future appointments. **If hasn't been seen in over a year OR hasn't followed up according to last diabetes/ADHD visit, make appointment for patient before sending refill to provider.     Rx requested:  Requested Prescriptions     Pending Prescriptions Disp Refills    traZODone (DESYREL) 50 MG tablet 90 tablet 1     Sig: Take 1 tablet by mouth nightly

## 2022-05-06 NOTE — TELEPHONE ENCOUNTER
From: Angel Pope  To: [facility]  Sent: 5/6/2022 7:27 AM EDT  Subject: Trazodone Refill    Jordy Sanchezh  I am out of Trazodone 50 mg. Is it possible to get a refill sent to [facility] in [facility]? I tried to go without it but it does help me sleep.

## 2022-06-27 RX ORDER — BUSPIRONE HYDROCHLORIDE 15 MG/1
TABLET ORAL
Qty: 90 TABLET | Refills: 0 | Status: SHIPPED | OUTPATIENT
Start: 2022-06-27 | End: 2022-07-12 | Stop reason: SDUPTHER

## 2022-07-11 ENCOUNTER — TELEMEDICINE (OUTPATIENT)
Dept: INTERNAL MEDICINE | Age: 57
End: 2022-07-11
Payer: COMMERCIAL

## 2022-07-11 DIAGNOSIS — F43.23 ADJUSTMENT DISORDER WITH MIXED ANXIETY AND DEPRESSED MOOD: ICD-10-CM

## 2022-07-11 PROCEDURE — G8427 DOCREV CUR MEDS BY ELIG CLIN: HCPCS | Performed by: PHYSICIAN ASSISTANT

## 2022-07-11 PROCEDURE — 99213 OFFICE O/P EST LOW 20 MIN: CPT | Performed by: PHYSICIAN ASSISTANT

## 2022-07-11 PROCEDURE — 3017F COLORECTAL CA SCREEN DOC REV: CPT | Performed by: PHYSICIAN ASSISTANT

## 2022-07-11 NOTE — PROGRESS NOTES
Magdalena Dixon (: 1965) is a 59 y.o. female, Established patient, here for evaluation of the following chief complaint(s): Anxiety (pt does not feel like it is working. would like to try someting else if possible)        ASSESSMENT/PLAN:  1. Adjustment disorder with mixed anxiety and depressed mood  - advised psych since she is maxxed on current meds, except Trazodone   - she declines and states she will continue current meds, and revisit at her annual   - busPIRone (BUSPAR) 15 MG tablet; take 1 tablet by mouth three times a day  Dispense: 90 tablet; Refill: 0  - traZODone (DESYREL) 50 MG tablet; Take 1 tablet by mouth nightly  Dispense: 90 tablet; Refill: 0  - PARoxetine (PAXIL) 40 MG tablet; take 1 tablet by mouth every morning  Dispense: 90 tablet; Refill: 0      No follow-ups on file. SUBJECTIVE/OBJECTIVE:  HPI      Follow up anxiety   She has been  on Buspar, and feels that this is not helping as much as it used to  She denies SI and  HI   Wants a higher dose    No insomnia     Review of Systems   Constitutional: Negative. HENT: Negative. Respiratory: Negative. Cardiovascular: Negative. Psychiatric/Behavioral: Negative for dysphoric mood and sleep disturbance. The patient is nervous/anxious. Patient-Reported Vitals 2021   Patient-Reported Weight 220   Patient-Reported Height 52   Patient-Reported Systolic 539   Patient-Reported Diastolic 80   Patient-Reported Pulse 67         Physical Exam  Pulmonary:      Effort: Pulmonary effort is normal.   Neurological:      Mental Status: She is alert and oriented to person, place, and time. Psychiatric:         Mood and Affect: Mood normal.         Behavior: Behavior normal.         Thought Content: Thought content normal.         Judgment: Judgment normal.         There were no vitals filed for this visit.           Magdalena Dixon is a 59 y.o. female being evaluated by a Virtual Visit (video visit) encounter to address concerns as

## 2022-07-12 RX ORDER — BUSPIRONE HYDROCHLORIDE 15 MG/1
TABLET ORAL
Qty: 90 TABLET | Refills: 0 | Status: SHIPPED | OUTPATIENT
Start: 2022-07-12 | End: 2022-08-02

## 2022-07-12 RX ORDER — PAROXETINE HYDROCHLORIDE 40 MG/1
TABLET, FILM COATED ORAL
Qty: 90 TABLET | Refills: 0 | Status: SHIPPED | OUTPATIENT
Start: 2022-07-12 | End: 2022-09-22 | Stop reason: SDUPTHER

## 2022-07-12 RX ORDER — TRAZODONE HYDROCHLORIDE 50 MG/1
50 TABLET ORAL NIGHTLY
Qty: 90 TABLET | Refills: 0 | Status: SHIPPED | OUTPATIENT
Start: 2022-07-12 | End: 2022-10-10 | Stop reason: SDUPTHER

## 2022-07-13 ASSESSMENT — ENCOUNTER SYMPTOMS: RESPIRATORY NEGATIVE: 1

## 2022-07-31 DIAGNOSIS — F43.23 ADJUSTMENT DISORDER WITH MIXED ANXIETY AND DEPRESSED MOOD: ICD-10-CM

## 2022-08-01 NOTE — TELEPHONE ENCOUNTER
Comments:     Last Office Visit (last PCP visit):   7/11/2022    Next Visit Date:  No future appointments. **If hasn't been seen in over a year OR hasn't followed up according to last diabetes/ADHD visit, make appointment for patient before sending refill to provider.     Rx requested:  Requested Prescriptions     Pending Prescriptions Disp Refills    busPIRone (BUSPAR) 15 MG tablet [Pharmacy Med Name: BUSPIRONE HCL 15 MG TABLET] 90 tablet 0     Sig: take 1 tablet by mouth three times a day

## 2022-08-02 RX ORDER — BUSPIRONE HYDROCHLORIDE 15 MG/1
TABLET ORAL
Qty: 90 TABLET | Refills: 3 | Status: SHIPPED | OUTPATIENT
Start: 2022-08-02

## 2022-09-21 DIAGNOSIS — F43.23 ADJUSTMENT DISORDER WITH MIXED ANXIETY AND DEPRESSED MOOD: ICD-10-CM

## 2022-09-21 NOTE — TELEPHONE ENCOUNTER
Comments:   Last Office Visit (last PCP visit):   7/11/2022    Next Visit Date:  No future appointments. **If hasn't been seen in over a year OR hasn't followed up according to last diabetes/ADHD visit, make appointment for patient before sending refill to provider.     Rx requested:  Requested Prescriptions     Pending Prescriptions Disp Refills    PARoxetine (PAXIL) 40 MG tablet 90 tablet 0     Sig: take 1 tablet by mouth every morning

## 2022-09-22 RX ORDER — PAROXETINE HYDROCHLORIDE 40 MG/1
TABLET, FILM COATED ORAL
Qty: 90 TABLET | Refills: 2 | Status: SHIPPED | OUTPATIENT
Start: 2022-09-22

## 2022-10-10 ENCOUNTER — OFFICE VISIT (OUTPATIENT)
Dept: INTERNAL MEDICINE | Age: 57
End: 2022-10-10
Payer: COMMERCIAL

## 2022-10-10 VITALS
TEMPERATURE: 97 F | WEIGHT: 211.4 LBS | OXYGEN SATURATION: 98 % | SYSTOLIC BLOOD PRESSURE: 116 MMHG | BODY MASS INDEX: 37.46 KG/M2 | DIASTOLIC BLOOD PRESSURE: 88 MMHG | HEART RATE: 67 BPM | HEIGHT: 63 IN | RESPIRATION RATE: 16 BRPM

## 2022-10-10 DIAGNOSIS — Z12.39 ENCOUNTER FOR SCREENING FOR MALIGNANT NEOPLASM OF BREAST, UNSPECIFIED SCREENING MODALITY: ICD-10-CM

## 2022-10-10 DIAGNOSIS — I10 ESSENTIAL HYPERTENSION: ICD-10-CM

## 2022-10-10 DIAGNOSIS — F43.23 ADJUSTMENT DISORDER WITH MIXED ANXIETY AND DEPRESSED MOOD: ICD-10-CM

## 2022-10-10 DIAGNOSIS — E66.09 CLASS 2 OBESITY DUE TO EXCESS CALORIES WITHOUT SERIOUS COMORBIDITY WITH BODY MASS INDEX (BMI) OF 37.0 TO 37.9 IN ADULT: ICD-10-CM

## 2022-10-10 DIAGNOSIS — Z13.220 LIPID SCREENING: ICD-10-CM

## 2022-10-10 DIAGNOSIS — Z00.00 ENCOUNTER FOR WELL ADULT EXAM WITHOUT ABNORMAL FINDINGS: ICD-10-CM

## 2022-10-10 DIAGNOSIS — Z78.0 POST-MENOPAUSAL: ICD-10-CM

## 2022-10-10 DIAGNOSIS — F41.1 GAD (GENERALIZED ANXIETY DISORDER): ICD-10-CM

## 2022-10-10 DIAGNOSIS — Z00.00 ENCOUNTER FOR WELL ADULT EXAM WITHOUT ABNORMAL FINDINGS: Primary | ICD-10-CM

## 2022-10-10 DIAGNOSIS — Z23 NEED FOR INFLUENZA VACCINATION: ICD-10-CM

## 2022-10-10 PROBLEM — E66.812 CLASS 2 OBESITY DUE TO EXCESS CALORIES WITHOUT SERIOUS COMORBIDITY WITH BODY MASS INDEX (BMI) OF 37.0 TO 37.9 IN ADULT: Status: ACTIVE | Noted: 2022-10-10

## 2022-10-10 PROBLEM — Z90.710 H/O TOTAL HYSTERECTOMY: Status: ACTIVE | Noted: 2022-10-10

## 2022-10-10 LAB
ALBUMIN SERPL-MCNC: 4.6 G/DL (ref 3.5–4.6)
ALP BLD-CCNC: 68 U/L (ref 40–130)
ALT SERPL-CCNC: 20 U/L (ref 0–33)
ANION GAP SERPL CALCULATED.3IONS-SCNC: 11 MEQ/L (ref 9–15)
AST SERPL-CCNC: 22 U/L (ref 0–35)
BASOPHILS ABSOLUTE: 0 K/UL (ref 0–0.2)
BASOPHILS RELATIVE PERCENT: 0.5 %
BILIRUB SERPL-MCNC: 0.8 MG/DL (ref 0.2–0.7)
BUN BLDV-MCNC: 17 MG/DL (ref 6–20)
CALCIUM SERPL-MCNC: 9.3 MG/DL (ref 8.5–9.9)
CHLORIDE BLD-SCNC: 104 MEQ/L (ref 95–107)
CHOLESTEROL, TOTAL: 225 MG/DL (ref 0–199)
CO2: 24 MEQ/L (ref 20–31)
CREAT SERPL-MCNC: 0.93 MG/DL (ref 0.5–0.9)
EOSINOPHILS ABSOLUTE: 0.1 K/UL (ref 0–0.7)
EOSINOPHILS RELATIVE PERCENT: 1.9 %
GFR AFRICAN AMERICAN: >60
GFR NON-AFRICAN AMERICAN: >60
GLOBULIN: 2.6 G/DL (ref 2.3–3.5)
GLUCOSE BLD-MCNC: 97 MG/DL (ref 70–99)
HCT VFR BLD CALC: 41 % (ref 37–47)
HDLC SERPL-MCNC: 56 MG/DL (ref 40–59)
HEMOGLOBIN: 13.8 G/DL (ref 12–16)
LDL CHOLESTEROL CALCULATED: 151 MG/DL (ref 0–129)
LYMPHOCYTES ABSOLUTE: 2 K/UL (ref 1–4.8)
LYMPHOCYTES RELATIVE PERCENT: 28.6 %
MCH RBC QN AUTO: 30.3 PG (ref 27–31.3)
MCHC RBC AUTO-ENTMCNC: 33.6 % (ref 33–37)
MCV RBC AUTO: 90.2 FL (ref 82–100)
MONOCYTES ABSOLUTE: 0.6 K/UL (ref 0.2–0.8)
MONOCYTES RELATIVE PERCENT: 8.2 %
NEUTROPHILS ABSOLUTE: 4.3 K/UL (ref 1.4–6.5)
NEUTROPHILS RELATIVE PERCENT: 60.8 %
PDW BLD-RTO: 14.1 % (ref 11.5–14.5)
PLATELET # BLD: 248 K/UL (ref 130–400)
POTASSIUM SERPL-SCNC: 4.4 MEQ/L (ref 3.4–4.9)
RBC # BLD: 4.54 M/UL (ref 4.2–5.4)
SODIUM BLD-SCNC: 139 MEQ/L (ref 135–144)
TOTAL PROTEIN: 7.2 G/DL (ref 6.3–8)
TRIGL SERPL-MCNC: 88 MG/DL (ref 0–150)
WBC # BLD: 7 K/UL (ref 4.8–10.8)

## 2022-10-10 PROCEDURE — 90471 IMMUNIZATION ADMIN: CPT | Performed by: PHYSICIAN ASSISTANT

## 2022-10-10 PROCEDURE — G8482 FLU IMMUNIZE ORDER/ADMIN: HCPCS | Performed by: PHYSICIAN ASSISTANT

## 2022-10-10 PROCEDURE — 99396 PREV VISIT EST AGE 40-64: CPT | Performed by: PHYSICIAN ASSISTANT

## 2022-10-10 PROCEDURE — G0447 BEHAVIOR COUNSEL OBESITY 15M: HCPCS | Performed by: PHYSICIAN ASSISTANT

## 2022-10-10 PROCEDURE — 90674 CCIIV4 VAC NO PRSV 0.5 ML IM: CPT | Performed by: PHYSICIAN ASSISTANT

## 2022-10-10 RX ORDER — TRAZODONE HYDROCHLORIDE 50 MG/1
50 TABLET ORAL NIGHTLY
Qty: 90 TABLET | Refills: 2 | Status: SHIPPED | OUTPATIENT
Start: 2022-10-10

## 2022-10-10 SDOH — ECONOMIC STABILITY: FOOD INSECURITY: WITHIN THE PAST 12 MONTHS, YOU WORRIED THAT YOUR FOOD WOULD RUN OUT BEFORE YOU GOT MONEY TO BUY MORE.: NEVER TRUE

## 2022-10-10 SDOH — ECONOMIC STABILITY: FOOD INSECURITY: WITHIN THE PAST 12 MONTHS, THE FOOD YOU BOUGHT JUST DIDN'T LAST AND YOU DIDN'T HAVE MONEY TO GET MORE.: NEVER TRUE

## 2022-10-10 ASSESSMENT — PATIENT HEALTH QUESTIONNAIRE - PHQ9
2. FEELING DOWN, DEPRESSED OR HOPELESS: 0
SUM OF ALL RESPONSES TO PHQ QUESTIONS 1-9: 0
SUM OF ALL RESPONSES TO PHQ9 QUESTIONS 1 & 2: 0
SUM OF ALL RESPONSES TO PHQ QUESTIONS 1-9: 0
1. LITTLE INTEREST OR PLEASURE IN DOING THINGS: 0
SUM OF ALL RESPONSES TO PHQ QUESTIONS 1-9: 0
SUM OF ALL RESPONSES TO PHQ QUESTIONS 1-9: 0

## 2022-10-10 ASSESSMENT — ENCOUNTER SYMPTOMS: RESPIRATORY NEGATIVE: 1

## 2022-10-10 ASSESSMENT — SOCIAL DETERMINANTS OF HEALTH (SDOH): HOW HARD IS IT FOR YOU TO PAY FOR THE VERY BASICS LIKE FOOD, HOUSING, MEDICAL CARE, AND HEATING?: NOT HARD AT ALL

## 2022-10-10 NOTE — PATIENT INSTRUCTIONS
Body Mass Index: Care Instructions  Your Care Instructions     Body mass index (BMI) can help you see if your weight is raising your risk for health problems. It uses a formula to compare how much you weigh with how tall you are. A BMI lower than 18.5 is considered underweight. A BMI between 18.5 and 24.9 is considered healthy. A BMI between 25 and 29.9 is considered overweight. A BMI of 30 or higher is considered obese. If your BMI is in the normal range, it means that you have a lower risk for weight-related health problems. If your BMI is in the overweight or obese range, you may be at increased risk for weight-related health problems, such as high blood pressure, heart disease, stroke, arthritis or joint pain, and diabetes. If your BMI is in the underweight range, you may be at increased risk for health problems such as fatigue, lower protection (immunity) against illness, muscle loss, bone loss, hair loss, and hormone problems. BMI is just one measure of your risk for weight-related health problems. You may be at higher risk for health problems if you are not active, you eat an unhealthy diet, or you drink too much alcohol or use tobacco products. Follow-up care is a key part of your treatment and safety. Be sure to make and go to all appointments, and call your doctor if you are having problems. It's also a good idea to know your test results and keep a list of the medicines you take. How can you care for yourself at home? Practice healthy eating habits. This includes eating plenty of fruits, vegetables, whole grains, lean protein, and low-fat dairy. If your doctor recommends it, get more exercise. Walking is a good choice. Bit by bit, increase the amount you walk every day. Try for at least 30 minutes on most days of the week. Do not smoke. Smoking can increase your risk for health problems. If you need help quitting, talk to your doctor about stop-smoking programs and medicines.  These can increase your chances of quitting for good. Limit alcohol to 2 drinks a day for men and 1 drink a day for women. Too much alcohol can cause health problems. If you have a BMI higher than 25  Your doctor may do other tests to check your risk for weight-related health problems. This may include measuring the distance around your waist. A waist measurement of more than 40 inches in men or 35 inches in women can increase the risk of weight-related health problems. Talk with your doctor about steps you can take to stay healthy or improve your health. You may need to make lifestyle changes to lose weight and stay healthy, such as changing your diet and getting regular exercise. If you have a BMI lower than 18.5  Your doctor may do other tests to check your risk for health problems. Talk with your doctor about steps you can take to stay healthy or improve your health. You may need to make lifestyle changes to gain or maintain weight and stay healthy, such as getting more healthy foods in your diet and doing exercises to build muscle. Where can you learn more? Go to https://Urban Internsleora.TenKod. org and sign in to your restOpolis account. Enter S176 in the [employer] box to learn more about \"Body Mass Index: Care Instructions. \"     If you do not have an account, please click on the \"Sign Up Now\" link. Current as of: December 27, 2021               Content Version: 13.4  © 2006-2022 Healthwise, Incorporated. Care instructions adapted under license by [facility] ([facility]). If you have questions about a medical condition or this instruction, always ask your healthcare professional. [place] [address] any warranty or liability for your use of this information. Learning About Low-Carbohydrate Diets  What is a low-carbohydrate diet? A low-carbohydrate (or \"low-carb\") diet limits foods and drinks that have carbohydrates.  This includes grains, fruits, milk and yogurt, and starchy vegetables like potatoes, beans, and corn. It also avoids foods and drinks that have added sugar. Instead, low-carb diets include foods that are high in protein and fat. Why might you follow a low-carb diet? Low-carb diets may be used for a variety of reasons, such as for weight loss. People who have diabetes may use a low-carb diet to help manage their blood sugar levels. What should you do before you start the diet? Talk to your doctor before you try any diet. This is even more important if you have health problems like kidney disease, heart disease, or diabetes. Your doctor may suggest that you meet with a registered dietitian. A dietitian can help you make an eating plan that works for you. What foods do you eat on a low-carb diet? On a low-carb diet, you choose foods that are high in protein and fat. Examples of these are:  Meat, poultry, and fish. Eggs. Nuts, such as walnuts, pecans, almonds, and peanuts. Peanut butter and other nut butters. Tofu. Avocado. Ruth Em. Non-starchy vegetables like broccoli, cauliflower, green beans, mushrooms, peppers, lettuce, and spinach. Unsweetened non-dairy milks like almond milk and coconut milk. Cheese, cottage cheese, and cream cheese. Where can you learn more? Go to https://Soxiablepepiceweb.healthWeb Performance. org and sign in to your Standardized Safety account. Enter C335 in the [facility] box to learn more about \"Learning About Low-Carbohydrate Diets. \"     If you do not have an account, please click on the \"Sign Up Now\" link. Current as of: May 9, 2022               Content Version: 13.4  © 4979-1581 Healthwise, [employer]. Care instructions adapted under license by [facility] ([facility]). If you have questions about a medical condition or this instruction, always ask your healthcare professional. [employer] any warranty or liability for your use of this information.            Well Visit, Women 48 to 72: Care Instructions  Overview     Well visits can help you stay healthy. Your doctor has checked your overall health and may have suggested ways to take good care of yourself. Your doctor also may have recommended tests. At home, you can help prevent illness with healthy eating, regular exercise, and other steps. Follow-up care is a key part of your treatment and safety. Be sure to make and go to all appointments, and call your doctor if you are having problems. It's also a good idea to know your test results and keep a list of the medicines you take. How can you care for yourself at home? Get screening tests that you and your doctor decide on. Screening helps find diseases before any symptoms appear. Eat healthy foods. Choose fruits, vegetables, whole grains, protein, and low-fat dairy foods. Limit fat, especially saturated fat. Reduce salt in your diet. Limit alcohol. Have no more than 1 drink a day or 7 drinks a week. Get at least 30 minutes of exercise on most days of the week. Walking is a good choice. You also may want to do other activities, such as running, swimming, cycling, or playing tennis or team sports. Reach and stay at a healthy weight. This will lower your risk for many problems, such as obesity, diabetes, heart disease, and high blood pressure. Do not smoke. Smoking can make health problems worse. If you need help quitting, talk to your doctor about stop-smoking programs and medicines. These can increase your chances of quitting for good. Care for your mental health. It is easy to get weighed down by worry and stress. Learn strategies to manage stress, like deep breathing and mindfulness, and stay connected with your family and community. If you find you often feel sad or hopeless, talk with your doctor. Treatment can help. Talk to your doctor about whether you have any risk factors for sexually transmitted infections (STIs).  You can help prevent STIs if you wait to have sex with a new partner (or partners) until [facility] each been tested for STIs. It also helps if you use condoms (male or female condoms) and if you limit your sex partners to one person who only has sex with you. Vaccines are available for some STIs. If you think you may have a problem with alcohol or drug use, talk to your doctor. This includes prescription medicines (such as amphetamines and opioids) and illegal drugs (such as cocaine and methamphetamine). Your doctor can help you figure out what type of treatment is best for you. Protect your skin from too much sun. When you're outdoors from 10 a.m. to 4 p.m., stay in the shade or cover up with clothing and a hat with a wide brim. Wear sunglasses that block UV rays. Even when it's cloudy, put broad-spectrum sunscreen (SPF 30 or higher) on any exposed skin. See a dentist one or two times a year for checkups and to have your teeth cleaned. Wear a seat belt in the car. When should you call for help? Watch closely for changes in your health, and be sure to contact your doctor if you have any problems or symptoms that concern you. Where can you learn more? Go to https://IBillionairepepiceweb.health-partners. org and sign in to your MobileVeda account. Enter B370 in the KylesSimple.TV box to learn more about \"Well Visit, Women 50 to 72: Care Instructions. \"     If you do not have an account, please click on the \"Sign Up Now\" link. Current as of: June 6, 2022               Content Version: 13.4  © 2006-2022 Healthwise, Incorporated. Care instructions adapted under license by [facility] ([facility]). If you have questions about a medical condition or this instruction, always ask your healthcare professional. [place] [address] any warranty or liability for your use of this information. Patient information: Weight loss treatments    INTRODUCTION -- Obesity is a major international problem, and Americans are among the heaviest people in the world.  The percentage of obese people in the [place] has risen steadily. Many people find that although they initially lose weight by dieting, they quickly regain the weight after the diet ends. Because it so hard to keep weight off over time, it is important to have as much information and support as possible before starting a diet. You are most likely to be successful in losing weight and keeping it off when you believe that your body weight can be controlled. STARTING A WEIGHT LOSS PROGRAM -- Some people like to talk to their doctor or nurse to get help choosing the best plan, monitoring progress, and getting advice and support along the way. To know what treatment (or combination of treatments) will work best, determine your body mass index (BMI) and waist circumference (measurement). The BMI is calculated from your height and weight. A person with a BMI between 25 and 29.9 is considered overweight   A person with a BMI of 30 or greater is considered to be obese  A waist circumference greater than 35 inches (88 cm) in women and 40 inches (102 cm) in men increases the risk of obesity-related complications, such as heart disease and diabetes. People who are obese and who have a larger waist size may need more aggressive weight loss treatment than others. Talk to your doctor or nurse for advice. Types of treatment -- Based on your measurements and your medical history, your doctor or nurse can determine what combination of weight loss treatments would work best for you. Treatments may include changes in lifestyle, exercise, dieting, and, in some cases, weight loss medicines or weight loss surgery. Weight loss surgery, also called bariatric surgery, is reserved for people with severe obesity who have not responded to other weight loss treatments. SETTING A WEIGHT LOSS GOAL -- It is important to set a realistic weight loss goal. Your first goal should be to avoid gaining more weight and staying at your current weight (or within 5 percent).  Many people have a \"dream\" weight that is difficult or impossible to achieve. People at high risk of developing diabetes who are able to lose 5 percent of their body weight and maintain this weight will reduce their risk of developing diabetes by about 50 percent and reduce their blood pressure. This is a success. Losing more than 15 percent of your body weight and staying at this weight is an extremely good result, even if you never reach your \"dream\" or \"ideal\" weight. LIFESTYLE CHANGES -- Programs that help you to change your lifestyle are usually run by psychologists or other professionals. The goals of lifestyle changes are to help you change your eating habits, become more active, and be more aware of how much you eat and exercise, helping you to make healthier choices. This type of treatment can be broken down into three steps: The triggers that make you want to eat   Eating   What happens after you eat  Triggers to eat -- Determining what triggers you to eat involves figuring out what foods you eat and where and when you eat. To figure out what triggers you to eat, keep a record for a few days of everything you eat, the places where you eat, how often you eat, and the emotions you were feeling when you ate. For some people, the trigger is related to a certain time of day or night. For others, the trigger is related to a certain place, like sitting at a desk working. Eating -- You can change your eating habits by breaking the chain of events between the trigger for eating and eating itself. There are many ways to do this. For instance, you can:  Limit where you eat to a few places (eg, dining room)   Restrict the number of utensils (eg, only a fork) used for eating   Drink a sip of water between each bite   Chew your food a certain number of times   Get up and stop eating every few minutes  What happens after you eat -- Rewarding yourself for good eating behaviors can help you to develop better habits.  This is not a reward for weight loss; instead, it is a reward for changing unhealthy behaviors. Do not use food as a reward. Some people find money, clothing, or personal care (eg, a hair cut, manicure, or massage) to be effective rewards. Treat yourself immediately after making better eating choices to reinforce the value of the good behavior. You need to have clear behavior goals, and you must have a time frame for reaching your goals. Reward small changes along the way to your final goal.  Other factors that contribute to successful weight loss -- Changing your behavior involves more than just changing unhealthy eating habits; it also involves finding people around you to support your weight loss, reducing stress, and learning to be strong when tempted by food. Establish a \"jenny\" system -- Having a friend or family member available to provide support and reinforce good behavior is very helpful. The support person needs to understand your goals. Learn to be strong -- Learning to be strong when tempted by food is an important part of losing weight. As an example, you will need to learn how to say \"no\" and continue to say no when urged to eat at parties and social gatherings. Develop strategies for events before you go, such as eating before you go or taking low-calorie snacks and drinks with you. Develop a support system -- Having a support system is helpful when losing weight. This is why many [employer] groups are successful. Family support is also essential; if your family does not support your efforts to lose weight, this can slow your progress or even keep you from losing weight. Positive thinking -- People often have conversations with themselves in their head; these conversations can be positive or negative. If you eat a piece of cake that was not planned, you may respond by thinking, \"Oh, you stupid idiot, you've blown your diet! \" and as a result, you may eat more cake.   A positive thought for the same event could be, \"Well, I ate cake when it was not on my plan. Now I should do something to get back on track. \" A positive approach is much more likely to be successful than a negative one. Reduce stress -- Although stress is a part of everyday life, it can trigger uncontrolled eating in some people. It is important to find a way to get through these difficult times without eating or by eating low-calorie food, like raw vegetables. It may be helpful to imagine a relaxing place that allows you to temporarily escape from stress. With deep breaths and closed eyes, you can imagine this relaxing place for a few minutes. Self-help programs -- Self-help programs like [employer] Watchers®, Overeaters Anonymous®, and Take Off Doni ([employer])© work for some people. As with all weight loss programs, you are most likely to be successful with these plans if you make long-term changes in how you eat. CHOOSING A DIET -- A calorie is a unit of energy found in food. Your body needs calories to function. The goal of any diet is to burn up more calories than you eat. How quickly you lose weight depends upon several factors, such as your age, gender, and starting weight. Older people have a slower metabolism than young people, so they lose weight more slowly. Men lose more weight than women of similar height and weight when dieting because they use more energy. People who are extremely overweight lose weight more quickly than those who are only mildly overweight. Try not to drink alcohol or drinks with added sugar, and most sweets (candy, cakes, cookies), since they rarely contain important nutrients. Portion-controlled diets -- One simple way to diet is to buy packaged foods, like frozen low-calorie meals or meal-replacement canned drinks.  A typical meal plan for one day may include:  A meal-replacement drink or breakfast bar for breakfast   A meal-replacement drink or a frozen low-calorie (250 to 350 calories) meal for lunch   A frozen low-calorie meal or other prepackaged, calorie-controlled meal, along with extra vegetables for dinner  This would give you 1000 to 1500 calories per day. Low-fat diet -- To reduce the amount of fat in your diet, you can:  Eat low-fat foods. Low-fat foods are those that contain less than 30 percent of calories from fat. Fat is listed on the food facts label (figure 1). Count fat grams. For a 1500 calorie diet, this would mean about 45 g or fewer of fat per day. Low-carbohydrate diet -- Low- and very-low-carbohydrate diets (eg, Atkins diet, [employer]) have become popular ways to lose weight quickly. With a very-low-carbohydrate diet, you eat between 0 and 60 grams of carbohydrates per day (a standard diet contains 200 to 300 grams of carbohydrates)   With a low-carbohydrate diet, you eat between 60 and 130 grams of carbohydrates per day  Carbohydrates are found in fruits, vegetables, and grains (including breads, rice, pasta, and cereal), alcoholic beverages, and in dairy products. Meat and fish do not contain carbohydrates. Side effects of very-low-carbohydrate diets can include constipation, headache, bad breath, muscle cramps, diarrhea, and weakness. Mediterranean diet -- The term \"Mediterranean diet\" refers to a way of eating that is common in [place] regions around the [facility]. Although there is some variation in Mediterranean diets, there are some similarities. Most Mediterranean diets include:  A high level of monounsaturated fats (from olive or canola oil, walnuts, pecans, almonds) and a low level of saturated fats (from butter)   A high amount of vegetables, fruits, legumes, and grains (7 to 10 servings of fruits and vegetables per day)   A moderate amount of milk and dairy products, mostly in the form of cheese. Use low-fat dairy products (skim milk, fat-free yogurt, low-fat cheese). A relatively low amount of red meat and meat products.  Substitute fish or poultry for red meat. For those who drink alcohol, a modest amount (mainly as red wine) may help to protect against cardiovascular disease. A modest amount is up to one (4 ounce) glass per day for women and up to two glasses per day for men. Which diet is best? -- Studies have compared different diets, including:  Very-low-carbohydrate (Atkins)   Macronutrient balance controlling glycemic load (Zone®)   Reduced-calorie (Weight Watchers®)   Very-low-fat (Ornish)  No one diet is \"best\" for weight loss. Any diet will help you to lose weight if you stick with the diet. Therefore, it is important to choose a diet that includes foods you like. Fad diets -- Fad diets often promise quick weight loss (more than 1 to 2 pounds per week) and may claim that you do not need to exercise or give up favorite foods. Some fad diets cost a lot of money, because you have to pay for seminars or pills. Fad diets generally lack any scientific evidence that they are safe and effective, but instead rely on \"before\" and \"after\" photos or testimonials. Diets that sound too good to be true usually are. These plans are a waste of time and money and are not recommended. A doctor, nurse, or nutritionist can help you find a safe and effective way to lose weight and keep it off. WEIGHT LOSS MEDICINES -- Taking a weight loss medicine may be helpful when used in combination with diet, exercise, and lifestyle changes. However, it is important to understand the risks and benefits of these medicines. It is also important to be realistic about your goal weight using a weight loss medicine; you may not reach your \"dream\" weight, but you may be able to reduce your risk of diabetes or heart disease.    Weight loss medicines may be recommended for people who have not been able to lose weight with diet and exercise who have a:  BMI of 30 or more    BMI between 27 and 29.9 and have other medical problems, such as diabetes, high cholesterol, or high blood pressure  Two weight loss medicines are approved in the [facility] for long-term use. These are sibutramine and orlistat. Other weight loss medicines (phentermine, diethylpropion) are available but are only approved for short-term use (up to 12 weeks). Sibutramine -- Sibutramine (Meridia®, Reductil®) is a medicine that reduces your appetite. In people who take the medicine for one year, the average weight loss is 10 percent of the initial body weight (5 percent more than those who took a placebo treatment). Side effects of sibutramine include insomnia, dry mouth, and constipation. Increases in blood pressure can occur. Therefore, blood pressure is usually monitored during treatment. There is no evidence that sibutramine causes heart or lung problems (like dexfenfluramine and fenfluramine (Phen/Fen)). However, experts agree that sibutramine should not used by people with coronary heart disease, heart failure, uncontrolled hypertension, stroke, irregular heart rhythms, or peripheral vascular disease (poor circulation in the legs). Orlistat -- Orlistat (Xenical® 120 mg capsules) is a medicine that reduces the amount of fat your body absorbs from the foods you eat. A lower-dose version is now available without a prescription (Bari® 60 mg capsules) in many countries, including the [facility]. The medicine is recommended three times per day, taken with a meal; you can skip a dose if you skip a meal or if the meal contains no fat. After one year of treatment with orlistat, the average weight loss is approximately 8 to 10 percent of initial body weight (4 percent more than in those who took a placebo). Cholesterol levels often improve, and blood pressure sometimes falls. In people with diabetes, orlistat may help control blood sugar levels. Side effects occur in 15 to 10 percent of people and may include stomach cramps, gas, diarrhea, leakage of stool, or oily stools.  These problems are more likely when you take orlistat with a high-fat meal (if more than 30 percent of calories in the meal are from fat). Side effects usually improve as you learn to avoid high-fat foods. Severe liver injury has been reported rarely in patients taking orlistat, but it is not known if orlistat caused the liver problems. Diet supplements -- Diet supplements are widely used by people who are trying to lose weight, although the safety and efficacy of these supplements are often unproven. A few of the more common diet supplements are discussed below; none of these are recommended because they have not been studied carefully, and there is no proof they are safe or effective. Chitosan and wheat dextrin are ineffective for weight loss, and their use is not recommended. Ephedra, a compound related to ephedrine, is no longer available in the [facility] due to safety concerns. Many nonprescription diet pills previously contained ephedra. Although some studies have shown that ephedra helps with weight loss, there can be serious side effects (psychiatric symptoms, palpitations, and stomach upset), including death. There are not enough data about the safety and efficacy of chromium, ginseng, glucomannan, green tea, hydroxycitric acid, L carnitine, psyllium, pyruvate supplements, [place] wort, and conjugated linoleic acid. Two supplements from [facility], [address] [place] (also known as the Lucero Vazquez 15 pill) and Herbathin dietary supplement, have been shown to contain prescription drugs. Hoodia gordonii is a dietary supplement derived from a plant in [place]. It is not recommended because there is no proof that it is safe or effective. Bitter orange (Citrus aurantium) can increase your heart rate and blood pressure and is not recommended.   SHOULD I HAVE SURGERY TO LOSE WEIGHT? -- Weight loss surgery is recommended ONLY for people with one of the following:  Severe obesity (body mass index above 40) (calculator 1 and calculator 2) who have not responded to diet, exercise, or weight loss medicines   Body mass index between 35 and 40, along with a serious medical problem (including diabetes, severe joint pain, or sleep apnea) that would improve with weight loss  You should be sure that you understand the potential risks and benefits of weight loss surgery. You must be motivated and willing to make lifelong changes in how you eat to reach and maintain a healthier weight after surgery. You must also be realistic about weight loss after surgery (see 'Effectiveness of weight loss surgery' below). PREPARING FOR WEIGHT LOSS SURGERY -- Most people who have weight loss surgery will meet with several specialists before surgery is scheduled. This often includes a dietitian, mental health counselor, a doctor who specializes in care of obese people, and a surgeon who performs weight loss surgery (bariatric surgeon). You may need to work with these providers for several weeks or months before surgery. The nutritionist will explain what and how much you will be able to eat after surgery. You may also need to lose a small amount of weight before surgery. The mental health specialist will help you to cope with stress and other factors that can make it harder to lose weight or trigger you to eat   The medical doctor will determine whether you need other tests, counseling, or treatment before surgery. He or she might also help you begin a medical weight loss program so that you can lose some weight before surgery. The bariatric surgeon will meet with you to discuss the surgeries available to treat obesity. He or she will also make sure you are a good candidate for surgery. TYPES OF WEIGHT LOSS SURGERY -- There are several types of weight loss surgeries, the most common being lap banding, gastric bypass, and gastric sleeve.   Lap banding -- Laparoscopic adjustable gastric banding (LAGB), or lap banding, is a surgery that uses an adjustable band around the opening to the stomach (figure 1). This reduces the amount of food that you can eat at one time. Lap banding is done through small incisions, with a laparoscope. The band can be adjusted after surgery, allowing you to eat more or less food. Adjustments to the size and tightness of the band are made by using a needle to add or remove fluid from a port (a small container under the skin that is connected to the band). Adding fluid to the band makes it tighter which restricts the amount of food you can eat and may help you to lose more weight. Lap banding is a popular choice because it is relatively simple to perform, can be adjusted or removed, and has a low risk of serious complications immediately after surgery. However, weight loss with the lap band depends on your ability to follow the program closely. You will need to prepare nutritious meals that [facility] with\" the band, not against it. For example, the lap band will not work well if you eat or drink a large amount of liquid calories (like ice cream). The band will not help you to feel full when you eat/drink liquid calories. Weight loss ranges from 45 to 75 percent after two years. As an example, a person who is 120 pounds overweight could expect to lose approximately 54 to 90 pounds in the two years after lap banding. Gastric bypass -- Fer-en-Y gastric bypass, also called gastric bypass, helps you to lose weight by reducing the amount of food you can eat and reducing the number of calories and nutrients you absorb from the food you eat. To perform gastric bypass, a surgeon creates a small stomach pouch by dividing the stomach and attaching it to the small intestine. This helps you to lose weight in two ways: The smaller stomach can hold less food than before surgery. This causes you to feel full after eating a very small amount of food or liquid. Over time, the pouch might stretch, allowing you to eat more food.    The body absorbs fewer calories, since food bypasses most of the stomach as well as the upper small intestine. This new arrangement seems to decrease your appetite and change how you break down foods by changing the release of various hormones. Gastric bypass can be performed as open surgery (through an incision on the abdomen) or laparoscopically, which uses smaller incisions and smaller instruments. Both the laparoscopic and open techniques have risks and benefits. You and your surgeon should work together to decide which surgery, if any, is right for you. Gastric bypass has a high success rate, and people lose an average of 62 to 68 percent of their excess body weight in the first year. Weight loss typically levels off after one to two years, with an overall excess weight loss between 50 and 75 percent. For a person who is 120 pounds overweight, an average of 60 to 90 pounds of weight loss would be expected. Gastric sleeve -- Gastric sleeve, also known as sleeve gastrectomy, is a surgery that reduces the size of the stomach and makes it into a narrow tube (figure 3). The new stomach is much smaller and produces less of the hormone (ghrelin) that causes hunger, helping you feel satisfied with less food. Sleeve gastrectomy is safer than gastric bypass because the intestines are not rearranged, and there is less chance of malnutrition. It also appears to control hunger better than lap banding. It might be safer than the lap banding because no foreign materials are used. The gastric sleeve has a good success rate, and people lose an average of 33 percent of their excess body weight in the first year. For a person who is 120 pounds overweight, this would mean losing about 40 pounds in the first year. WEIGHT LOSS SURGERY COMPLICATIONS -- A variety of complications can occur with weight loss surgery. The risks of surgery depend upon which surgery you have and any medical problems you had before surgery.  Some of the more common early surgical complications (one to six weeks after surgery) include:  Bleeding   Infection   Blockage or tear in the bowels   Need for further surgery  Important medical complications after surgery can include blood clots in the legs or lungs, heart attack, pneumonia, and urinary tract infection. Complications are less likely when surgery is performed in centers that are experienced in weight loss surgery. In general, centers with experience in weight loss surgery have:  Board-certified doctors and surgeons   A team of support staff (dietitians, counselors, nurses)   Long-term follow-up after surgery   Hospital staff experienced with the care of weight loss patients. This includes nurses who are trained in the care of patients immediately after surgery and anesthesiologists who are experienced in caring for the morbidly obese. EFFECTIVENESS OF WEIGHT LOSS SURGERY -- The goal of weight loss surgery is to reduce the risk of illness or death associated with obesity. Weight loss surgery can also help you to feel and look better, reduce the amount of money you spend on medicines, and cut down on sick days. As an example, weight loss surgery can improve health problems related to obesity (diabetes, high blood pressure, high cholesterol, sleep apnea) to the point that you need less or no medicine. Finally, weight loss surgery might reduce your risk of developing heart disease, cancer, and certain infections. AFTER WEIGHT LOSS SURGERY -- You will need to stay in the hospital until your team feels that it is safe for you to leave (on average, one to three days). Do not drive if you are taking prescription pain medicine. Begin exercising as soon as possible once you have healed; most weight loss centers will design an exercise program for you. Once you are home, it is important to eat and drink exactly what your doctor and dietitian recommend.  You will see your doctor, nurse, and dietitian on a regular basis after surgery to monitor your health, diet, and weight loss. You will be able to slowly increase how much you eat over time, although it will always be important to:  Eat small, frequent meals and not skip meals   Chew your food slowly and completely   Avoid eating while \"distracted\" (such as eating while watching TV)   Stop eating when you feel full   Drink liquids at least 30 minutes before or after eating   Avoid foods high in fat or sugar   Take vitamin supplements, as recommended  It can take several months to learn to listen to your body so that you know when you are hungry and when you are full. You may dislike foods you previously loved, and you may begin to prefer new foods. This can be a frustrating process for some people, so talk to your dietitian if you are having trouble. It usually takes between one and two years to lose weight after surgery. After reaching their goal weight, some people have plastic surgery (called \"body contouring\") to remove excess skin from the body, particularly in the abdominal area. Before you decide to have weight loss surgery, you must commit to staying healthy for life. This includes following up with your healthcare team, exercising most days of the week, and eating a sensible diet every day. It can be difficult to develop new eating and exercise habits after weight loss surgery, and you will have to work hard to stick to your goals. Recovering from surgery and losing weight can be stressful and emotional, and it is important to have the support of family and friends. Working with a , therapist, or support group can help you through the ups and downs. WHERE TO GET MORE INFORMATION -- Your healthcare provider is the best source of information for questions and concerns related to your medical problem.   This article will be updated as needed every four months on our Web site ([email or web address])

## 2022-10-10 NOTE — PROGRESS NOTES
Vaccine Information Sheet, \"Influenza - Inactivated\"  given to Amadeo Guerra, or parent/legal guardian of  Amadeo Guerra and verbalized understanding. Patient responses:    Have you ever had a reaction to a flu vaccine? No  Are you able to eat eggs without adverse effects? No  Do you have any current illness? No  Have you ever had [place] Syndrome? No    Flu vaccine given per order. Please see immunization tab.

## 2022-10-10 NOTE — PROGRESS NOTES
Well Adult Note  Name: Bassam Cartagena Date: 10/18/2022   MRN: [id number] Sex: Female   Age: 59 y.o. Ethnicity: Non- / Non    : 1965 Race: White (non-)      Esequiel Davison is here for well adult exam.  History:no new concerns         Review of Systems   Constitutional: Negative. HENT: Negative. Respiratory: Negative. Endocrine: Negative. Genitourinary: Negative. Musculoskeletal: Negative. Neurological: Negative. Psychiatric/Behavioral: Negative. Allergies   Allergen Reactions    Sulfa Antibiotics      hives    Other Rash    Penicillins Rash     hives         Prior to Visit Medications    Medication Sig Taking?  Authorizing Provider   traZODone (DESYREL) 50 MG tablet Take 1 tablet by mouth nightly Yes JEANNA Smith   PARoxetine (PAXIL) 40 MG tablet take 1 tablet by mouth every morning Yes JEANNA Alejandro   busPIRone (BUSPAR) 15 MG tablet take 1 tablet by mouth three times a day Yes JEANNA Smith   fluticasone (FLONASE) 50 MCG/ACT nasal spray 1 spray by Nasal route daily Yes ROSANNE Rosado   Carboxymeth-Cellulose-CitricAc (PLENITY WELCOME KIT) CAPS Take 3  capsules  20 minutes before lunch and dinner with 16 ounces of water  JEANNA Garcia         Past Medical History:   Diagnosis Date    Osteopenia        Past Surgical History:   Procedure Laterality Date    HYSTERECTOMY (CERVIX STATUS UNKNOWN)           Family History   Problem Relation Age of Onset    Breast Cancer Paternal Grandmother     Breast Cancer Paternal Aunt        Social History     Tobacco Use    Smoking status: Never    Smokeless tobacco: Never   Substance Use Topics    Alcohol use: No    Drug use: Never       Objective   /88 (Site: Right Upper Arm, Position: Sitting, Cuff Size: Large Adult)   Pulse 67   Temp 97 °F (36.1 °C)   Resp 16   Ht 5' 3\" (1.6 m)   Wt 211 lb 6.4 oz (95.9 kg)   LMP  (LMP Unknown)   SpO2 98%   BMI 37.45 kg/m² Wt Readings from Last 3 Encounters:   10/10/22 211 lb 6.4 oz (95.9 kg)   11/25/21 215 lb (97.5 kg)   09/28/21 220 lb (99.8 kg)     There were no vitals filed for this visit. Physical Exam  Vitals reviewed. Constitutional:       Appearance: Normal appearance. She is obese. HENT:      Head: Atraumatic. Cardiovascular:      Rate and Rhythm: Normal rate and regular rhythm. Heart sounds: Normal heart sounds. Abdominal:      General: Bowel sounds are normal.      Palpations: Abdomen is soft. Musculoskeletal:      Cervical back: Normal range of motion. Skin:     General: Skin is warm. Neurological:      General: No focal deficit present. Mental Status: She is alert and oriented to person, place, and time. Psychiatric:         Thought Content: Thought content normal.         Assessment   Plan   1. Encounter for well adult exam without abnormal findings  -     CBC with Auto Differential; Future  -     Comprehensive Metabolic Panel; Future  2. Essential hypertension  3. BESSY (generalized anxiety disorder)  4. Adjustment disorder with mixed anxiety and depressed mood  -     traZODone (DESYREL) 50 MG tablet; Take 1 tablet by mouth nightly, Disp-90 tablet, R-2Normal  5. Post-menopausal  -     DEXA BONE DENSITY AXIAL SKELETON; Future  6. Class 2 obesity due to excess calories without serious comorbidity with body mass index (BMI) of 37.0 to 37.9 in adult  -     VT Behavior  obesity 15m []  7. Encounter for screening for malignant neoplasm of breast, unspecified screening modality  -     [facility] DOMENICO DIGITAL SCREEN BILATERAL; Future  8. Lipid screening  -     Lipid Panel; Future  9.  Need for influenza vaccination  -     Influenza, FLUCELVAX, (age 10 mo+), IM, Preservative Free, 0.5 mL         Personalized Preventive Plan   Current Health Maintenance Status  Immunization History   Administered Date(s) Administered    COVID-19, MODERNA BLUE border, Primary or Immunocompromised, (age 12y+), IM, 100 mcg/0.5mL 04/07/2021, 05/07/2021    Influenza, FLUCELVAX, (age 10 mo+), MDCK, PF, 0.5mL 10/10/2022    Influenza, Triv, 3 Years and older, IM, PF (Afluria 5yrs and older) 11/07/2017        Health Maintenance   Topic Date Due    HIV screen  Never done    Hepatitis C screen  Never done    Shingles vaccine (1 of 2) Never done    COVID-19 Vaccine (3 - Booster for Moderna series) 10/07/2021    Breast cancer screen  01/11/2023    Depression Screen  10/10/2023    DTaP/Tdap/Td vaccine (2 - Td or Tdap) 09/14/2025    Lipids  10/10/2027    Colorectal Cancer Screen  03/18/2029    Flu vaccine  Completed    Hepatitis A vaccine  Aged Out    Hib vaccine  Aged Out    Meningococcal (ACWY) vaccine  Aged Out    Pneumococcal 0-64 years Vaccine  Aged Out     Recommendations for Decurate Due: see orders and patient instructions/AVS.    Return in 1 year (on 10/10/2023). Obesity Counseling: Assessed behavioral health risks and factors affecting choice of behavior. Suggested weight control approaches, including dietary changes behavioral modification and follow up plan. Provided educational and support documentation.   Time spent (minutes): 15

## 2022-10-18 ENCOUNTER — TELEPHONE (OUTPATIENT)
Dept: INTERNAL MEDICINE | Age: 57
End: 2022-10-18

## 2022-10-18 RX ORDER — CARBOXYMETHYLCELLULOSE/CITRIC 0.75 G
CAPSULE ORAL
Qty: 540 CAPSULE | Refills: 0 | Status: SHIPPED | OUTPATIENT
Start: 2022-10-18

## 2022-10-18 NOTE — TELEPHONE ENCOUNTER
[facility] (supporting [place]) 20 hours ago (11:58 AM)     I am attempting to order Plenity and I received an email that you haven't prescribed the medication. Can you help me with this? I believe it will be through [employer].

## 2022-11-02 ENCOUNTER — HOSPITAL ENCOUNTER (OUTPATIENT)
Dept: WOMENS IMAGING | Age: 57
Discharge: HOME | End: 2022-11-04
Payer: COMMERCIAL

## 2022-11-02 DIAGNOSIS — Z12.39 ENCOUNTER FOR SCREENING FOR MALIGNANT NEOPLASM OF BREAST, UNSPECIFIED SCREENING MODALITY: ICD-10-CM

## 2022-11-02 DIAGNOSIS — Z78.0 POST-MENOPAUSAL: ICD-10-CM

## 2022-11-02 PROCEDURE — 77063 BREAST TOMOSYNTHESIS BI: CPT

## 2022-11-02 PROCEDURE — 77080 DXA BONE DENSITY AXIAL: CPT

## 2022-11-03 PROBLEM — M85.80 OSTEOPENIA: Status: ACTIVE | Noted: 2022-11-03

## 2022-11-08 ENCOUNTER — APPOINTMENT (OUTPATIENT)
Dept: CT IMAGING | Age: 57
End: 2022-11-08
Payer: COMMERCIAL

## 2022-11-08 ENCOUNTER — HOSPITAL ENCOUNTER (EMERGENCY)
Age: 57
Discharge: HOME | End: 2022-11-08
Attending: EMERGENCY MEDICINE
Payer: COMMERCIAL

## 2022-11-08 VITALS
TEMPERATURE: 97.7 F | HEART RATE: 75 BPM | OXYGEN SATURATION: 98 % | DIASTOLIC BLOOD PRESSURE: 89 MMHG | WEIGHT: 211 LBS | BODY MASS INDEX: 37.38 KG/M2 | SYSTOLIC BLOOD PRESSURE: 140 MMHG | RESPIRATION RATE: 17 BRPM

## 2022-11-08 DIAGNOSIS — K57.32 DIVERTICULITIS OF COLON: Primary | ICD-10-CM

## 2022-11-08 LAB
ANION GAP SERPL CALCULATED.3IONS-SCNC: 9 MEQ/L (ref 9–15)
BACTERIA: ABNORMAL /HPF
BASOPHILS ABSOLUTE: 0 K/UL (ref 0–0.1)
BASOPHILS RELATIVE PERCENT: 0.3 % (ref 0.1–1.2)
BILIRUBIN URINE: ABNORMAL
BLOOD, URINE: ABNORMAL
BUN BLDV-MCNC: 12 MG/DL (ref 6–20)
CALCIUM SERPL-MCNC: 9.5 MG/DL (ref 8.5–9.9)
CHLORIDE BLD-SCNC: 103 MEQ/L (ref 95–107)
CLARITY: CLEAR
CO2: 27 MEQ/L (ref 20–31)
COLOR: YELLOW
CREAT SERPL-MCNC: 0.99 MG/DL (ref 0.5–0.9)
EOSINOPHILS ABSOLUTE: 0.1 K/UL (ref 0–0.4)
EOSINOPHILS RELATIVE PERCENT: 0.4 % (ref 0.7–5.8)
EPITHELIAL CELLS, UA: ABNORMAL /HPF
GFR SERPL CREATININE-BSD FRML MDRD: >60 ML/MIN/{1.73_M2}
GLUCOSE BLD-MCNC: 111 MG/DL (ref 70–99)
GLUCOSE URINE: NEGATIVE MG/DL
HCT VFR BLD CALC: 41.9 % (ref 37–47)
HEMOGLOBIN: 13.8 G/DL (ref 11.2–15.7)
IMMATURE GRANULOCYTES #: 0.1 K/UL
IMMATURE GRANULOCYTES %: 0.4 %
KETONES, URINE: 40 MG/DL
LEUKOCYTE ESTERASE, URINE: ABNORMAL
LYMPHOCYTES ABSOLUTE: 2.3 K/UL (ref 1.2–3.7)
LYMPHOCYTES RELATIVE PERCENT: 15.5 %
MCH RBC QN AUTO: 29.7 PG (ref 25.6–32.2)
MCHC RBC AUTO-ENTMCNC: 32.9 % (ref 32.2–35.5)
MCV RBC AUTO: 90.1 FL (ref 79.4–94.8)
MONOCYTES ABSOLUTE: 1.4 K/UL (ref 0.2–0.9)
MONOCYTES RELATIVE PERCENT: 9.1 % (ref 4.7–12.5)
NEUTROPHILS ABSOLUTE: 11.1 K/UL (ref 1.6–6.1)
NEUTROPHILS RELATIVE PERCENT: 74.3 % (ref 34–71.1)
NITRITE, URINE: NEGATIVE
PDW BLD-RTO: 13.5 % (ref 11.7–14.4)
PH UA: 6 (ref 5–9)
PLATELET # BLD: 243 K/UL (ref 182–369)
POTASSIUM SERPL-SCNC: 3.8 MEQ/L (ref 3.4–4.9)
PROTEIN UA: NEGATIVE MG/DL
RBC # BLD: 4.65 M/UL (ref 3.93–5.22)
RBC UA: ABNORMAL /HPF (ref 0–2)
SODIUM BLD-SCNC: 139 MEQ/L (ref 135–144)
SPECIFIC GRAVITY UA: 1.02 (ref 1–1.03)
UROBILINOGEN, URINE: 0.2 E.U./DL
WBC # BLD: 15 K/UL (ref 4–10)
WBC UA: ABNORMAL /HPF (ref 0–5)

## 2022-11-08 PROCEDURE — 85025 COMPLETE CBC W/AUTO DIFF WBC: CPT

## 2022-11-08 PROCEDURE — 96374 THER/PROPH/DIAG INJ IV PUSH: CPT

## 2022-11-08 PROCEDURE — 96375 TX/PRO/DX INJ NEW DRUG ADDON: CPT

## 2022-11-08 PROCEDURE — 2580000003 HC RX 258: Performed by: EMERGENCY MEDICINE

## 2022-11-08 PROCEDURE — 81001 URINALYSIS AUTO W/SCOPE: CPT

## 2022-11-08 PROCEDURE — 99284 EMERGENCY DEPT VISIT MOD MDM: CPT

## 2022-11-08 PROCEDURE — 6370000000 HC RX 637 (ALT 250 FOR IP): Performed by: EMERGENCY MEDICINE

## 2022-11-08 PROCEDURE — 74176 CT ABD & PELVIS W/O CONTRAST: CPT

## 2022-11-08 PROCEDURE — 6360000002 HC RX W HCPCS: Performed by: EMERGENCY MEDICINE

## 2022-11-08 PROCEDURE — 80048 BASIC METABOLIC PNL TOTAL CA: CPT

## 2022-11-08 PROCEDURE — 36415 COLL VENOUS BLD VENIPUNCTURE: CPT

## 2022-11-08 RX ORDER — 0.9 % SODIUM CHLORIDE 0.9 %
500 INTRAVENOUS SOLUTION INTRAVENOUS ONCE
Status: COMPLETED | OUTPATIENT
Start: 2022-11-08 | End: 2022-11-08

## 2022-11-08 RX ORDER — METRONIDAZOLE 500 MG/1
500 TABLET ORAL ONCE
Status: COMPLETED | OUTPATIENT
Start: 2022-11-08 | End: 2022-11-08

## 2022-11-08 RX ORDER — CIPROFLOXACIN 500 MG/1
500 TABLET, FILM COATED ORAL ONCE
Status: COMPLETED | OUTPATIENT
Start: 2022-11-08 | End: 2022-11-08

## 2022-11-08 RX ORDER — CIPROFLOXACIN 500 MG/1
500 TABLET, FILM COATED ORAL 2 TIMES DAILY
Qty: 20 TABLET | Refills: 0 | Status: SHIPPED | OUTPATIENT
Start: 2022-11-08 | End: 2022-11-18

## 2022-11-08 RX ORDER — HYDROCODONE BITARTRATE AND ACETAMINOPHEN 5; 325 MG/1; MG/1
1 TABLET ORAL EVERY 6 HOURS PRN
Qty: 12 TABLET | Refills: 0 | Status: SHIPPED | OUTPATIENT
Start: 2022-11-08 | End: 2022-11-11

## 2022-11-08 RX ORDER — MORPHINE SULFATE 2 MG/ML
2 INJECTION, SOLUTION INTRAMUSCULAR; INTRAVENOUS ONCE
Status: COMPLETED | OUTPATIENT
Start: 2022-11-08 | End: 2022-11-08

## 2022-11-08 RX ORDER — ONDANSETRON 2 MG/ML
4 INJECTION INTRAMUSCULAR; INTRAVENOUS ONCE
Status: COMPLETED | OUTPATIENT
Start: 2022-11-08 | End: 2022-11-08

## 2022-11-08 RX ORDER — ONDANSETRON 4 MG/1
4 TABLET, ORALLY DISINTEGRATING ORAL EVERY 4 HOURS PRN
Qty: 8 TABLET | Refills: 0 | Status: SHIPPED | OUTPATIENT
Start: 2022-11-08

## 2022-11-08 RX ORDER — KETOROLAC TROMETHAMINE 15 MG/ML
15 INJECTION, SOLUTION INTRAMUSCULAR; INTRAVENOUS ONCE
Status: COMPLETED | OUTPATIENT
Start: 2022-11-08 | End: 2022-11-08

## 2022-11-08 RX ORDER — METRONIDAZOLE 500 MG/1
500 TABLET ORAL 2 TIMES DAILY
Qty: 20 TABLET | Refills: 0 | Status: SHIPPED | OUTPATIENT
Start: 2022-11-08 | End: 2022-11-18

## 2022-11-08 RX ADMIN — SODIUM CHLORIDE 500 ML: 9 INJECTION, SOLUTION INTRAVENOUS at 08:55

## 2022-11-08 RX ADMIN — MORPHINE SULFATE 2 MG: 2 INJECTION, SOLUTION INTRAMUSCULAR; INTRAVENOUS at 08:55

## 2022-11-08 RX ADMIN — KETOROLAC TROMETHAMINE 15 MG: 15 INJECTION, SOLUTION INTRAMUSCULAR; INTRAVENOUS at 08:55

## 2022-11-08 RX ADMIN — ONDANSETRON 4 MG: 2 INJECTION INTRAMUSCULAR; INTRAVENOUS at 08:55

## 2022-11-08 RX ADMIN — CIPROFLOXACIN HYDROCHLORIDE 500 MG: 500 TABLET, FILM COATED ORAL at 10:14

## 2022-11-08 RX ADMIN — METRONIDAZOLE 500 MG: 500 TABLET ORAL at 10:14

## 2022-11-08 ASSESSMENT — PAIN DESCRIPTION - DESCRIPTORS: DESCRIPTORS: SHARP

## 2022-11-08 ASSESSMENT — PAIN DESCRIPTION - LOCATION: LOCATION: ABDOMEN;FLANK

## 2022-11-08 ASSESSMENT — ENCOUNTER SYMPTOMS
COUGH: 0
SORE THROAT: 0
EYE DISCHARGE: 0
SINUS PAIN: 0
BACK PAIN: 1
DIARRHEA: 0
SHORTNESS OF BREATH: 0
EYE REDNESS: 0
NAUSEA: 1
COLOR CHANGE: 0
CONSTIPATION: 0
ABDOMINAL PAIN: 1
VOMITING: 0

## 2022-11-08 ASSESSMENT — PAIN - FUNCTIONAL ASSESSMENT: PAIN_FUNCTIONAL_ASSESSMENT: 0-10

## 2022-11-08 ASSESSMENT — PAIN SCALES - GENERAL
PAINLEVEL_OUTOF10: 0
PAINLEVEL_OUTOF10: 1
PAINLEVEL_OUTOF10: 4

## 2022-11-08 ASSESSMENT — PAIN DESCRIPTION - ORIENTATION: ORIENTATION: LEFT

## 2022-11-08 ASSESSMENT — PAIN DESCRIPTION - PAIN TYPE: TYPE: ACUTE PAIN

## 2022-11-08 ASSESSMENT — PAIN DESCRIPTION - FREQUENCY: FREQUENCY: INTERMITTENT

## 2022-11-08 NOTE — ED NOTES
Pt is given d/c instructions, four scripts and work excuse. Pt voiced understanding of d/c instructions without further questions.       Bill Romero RN  11/08/22 1037

## 2022-11-08 NOTE — ED TRIAGE NOTES
Pt arrives to ED, from home, via personal vehicle-her spouse at her bedside. Pt presents with left side abd and flank pain, intermittent-sharp in nature, \"for a few days. \" Pt also admits to moving her bowels frequently, \"but not very much when I go-it looks like I've been eating corn when I haven't been. \"

## 2022-11-08 NOTE — ED PROVIDER NOTES
[facility] ED  EMERGENCY DEPARTMENT ENCOUNTER      Pt Name: Natividad Mayer  MRN: [id number]  [place] 1965  Date of evaluation: 11/8/2022  Provider: Lukas Fenton DO    CHIEF COMPLAINT       Chief Complaint   Patient presents with    Abdominal Pain    Flank Pain     left     Chief complaint: Left side pain  History of chief complaint: 64-year-old female presents the emergency department complaining of left side pain. Patient states started up about 2 or 3 days ago. Patient states that sharp stabbing pain states she feels it in her left low back and around to the left lower quadrant of the abdomen. Patient states she has been doing some heavy cleaning at home and thought maybe she just pulled a muscle. Patient states pain is variable in intensity states at times it will double her over, patient states no pain currently. Patient reports associated nausea with the pain no vomiting or diarrhea bowels have been moving normally no dysuria hematuria frequency or urgency no associated fevers or chills. Patient denies any radiation of pain into the buttocks or lower extremities no loss of bowel or bladder control no numb tingling or weakness to the extremities. She denies any fevers or chills no chest pain palpitation short of breath cough cold weak or dizzy. Patient states she has had previous cholecystectomy appendectomy hysterectomy. Patient states she was diagnosed with polyps before on a colonoscopy denies any known history of diverticulitis or kidney stones. Nursing Notes were reviewed. REVIEW OF SYSTEMS    (2-9 systems for level 4, 10 or more for level 5)     Review of Systems   Constitutional:  Negative for chills, diaphoresis and fever. HENT:  Negative for congestion, sinus pain and sore throat. Eyes:  Negative for discharge and redness. Respiratory:  Negative for cough and shortness of breath. Cardiovascular:  Negative for chest pain, palpitations and leg swelling. Gastrointestinal:  Positive for abdominal pain and nausea. Negative for constipation, diarrhea and vomiting. Genitourinary:  Negative for difficulty urinating, dysuria, flank pain, frequency and hematuria. Musculoskeletal:  Positive for back pain. Negative for neck pain. Skin:  Negative for color change and rash. Neurological:  Negative for dizziness, weakness and numbness. Hematological:  Negative for adenopathy. Except as noted above the remainder of the review of systems was reviewed and negative.        PAST MEDICAL HISTORY     Past Medical History:   Diagnosis Date    Osteopenia 2013         SURGICAL HISTORY       Past Surgical History:   Procedure Laterality Date    HYSTERECTOMY (CERVIX STATUS UNKNOWN)           CURRENT MEDICATIONS       Previous Medications    BUSPIRONE (BUSPAR) 15 MG TABLET    take 1 tablet by mouth three times a day    CARBOXYMETH-CELLULOSE-CITRICAC (PLENITY WELCOME KIT) CAPS    Take 3  capsules  20 minutes before lunch and dinner with 16 ounces of water    FLUTICASONE (FLONASE) 50 MCG/ACT NASAL SPRAY    1 spray by Nasal route daily    PAROXETINE (PAXIL) 40 MG TABLET    take 1 tablet by mouth every morning    TRAZODONE (DESYREL) 50 MG TABLET    Take 1 tablet by mouth nightly       ALLERGIES     Sulfa antibiotics, Other, and Penicillins    FAMILY HISTORY       Family History   Problem Relation Age of Onset    Breast Cancer Paternal Grandmother     Breast Cancer Paternal Aunt           SOCIAL HISTORY       Social History     Socioeconomic History    Marital status:    Tobacco Use    Smoking status: Never    Smokeless tobacco: Never   Substance and Sexual Activity    Alcohol use: No    Drug use: Never    Sexual activity: Yes     Partners: Male     Social Determinants of Health     Financial Resource Strain: Low Risk     Difficulty of Paying Living Expenses: Not hard at all   Food Insecurity: No Food Insecurity    Worried About Running Out of Food in the Last Year: Never true    Ran Out of Food in the Last Year: Never true       PHYSICAL EXAM    (up to 7 for level 4, 8 or more for level 5)     ED Triage Vitals   BP Temp Temp src Pulse Resp SpO2 Height Weight   -- -- -- -- -- -- -- --       Physical Exam  General appearance: Patient is awake alert interactive appropriate nontoxic in no acute distress  Head is atraumatic normocephalic  Eyes pupils are equal and reactive sclera white conjunctive are pink  Oral pharyngeal cavity is pink with good moisture, no exudates or ulcerations no asymmetry, the airway is widely patent  Neck: Supple no meningeal signs no adenopathy no JVD  Heart: Regular rate and rhythm no gross murmurs rubs or clicks  Lungs: Breath sounds are clear with good air movement throughout no active wheezes rales or rhonchi no respiratory distress  Abdomen: Obese, soft with tenderness on palpation over the left lower quadrant, there are good bowel sounds no rebound rigidity or guarding no firm or pulsatile masses, no gross distention, femoral pulses full and symmetric  Back: There is mild tenderness to palpation over the low left lumbar paravertebral muscles left SI joint area no pain into the buttocks. No costovertebral angle tenderness. Straight leg raise test is negative skin: Warm and dry without rashes  Lower extremities: No edema or calf tenderness or asymmetry. Motor sensory pulses intact distally    DIAGNOSTIC RESULTS       RADIOLOGY:   Non-plain film images such as CT, Ultrasound and MRI are read by the radiologist. Plain radiographic images are visualized and preliminarily interpreted by the emergency physician with the below findings:      Interpretation per the Radiologist below, if available at the time of this note:    CT ABDOMEN PELVIS WO CONTRAST Additional Contrast? None   Final Result   Findings to suggest a mild acute sigmoid diverticulitis. No signs of   perforation or abscess.              LABS:  Labs Reviewed   BASIC METABOLIC PANEL - Abnormal; Notable for the following components:       Result Value    Glucose 111 (*)     Creatinine 0.99 (*)     All other components within normal limits   CBC WITH AUTO DIFFERENTIAL - Abnormal; Notable for the following components:    WBC 15.0 (*)     Neutrophils % 74.3 (*)     Eosinophils % 0.4 (*)     Neutrophils Absolute 11.1 (*)     Monocytes Absolute 1.4 (*)     All other components within normal limits   URINALYSIS - Abnormal; Notable for the following components:    Ketones, Urine 40 (*)     All other components within normal limits   Laboratory review: CBC reveals a leukocytosis of 15.0 BMP is essentially normal urinalysis shows no sign of infection    All other labs were within normal range or not returned as of this dictation. EMERGENCY DEPARTMENT COURSE and DIFFERENTIAL DIAGNOSIS/MDM:   Vitals:    Vitals:    11/08/22 0840   BP: (!) 140/89   Pulse: 75   Resp: 17   Temp: 97.7 °F (36.5 °C)   TempSrc: Oral   SpO2: 98%   Weight: 211 lb (95.7 kg)       Treatment and course: Patient had an IV established normal saline 500 mL IV bolus was given along with morphine 2 mg IV Toradol 15 mg IV Zofran 4 mg IV. Cipro and Flagyl p.o. were initiated here      FINAL IMPRESSION      1. Diverticulitis of colon          DISPOSITION/PLAN   DISPOSITION    Patient discharged home with family advised rest, increase fluids home-going prescription for 10-day course of Cipro Flagyl were written Vicodin No. 12 was written Zofran No. 8 was written. Patient advised to return if any change or worsening increasing pain fever vomiting not keeping down fluids or medications, dark or bloody stools weak or dizzy feeling.   Patient to follow-up with primary physician for repeat assessment in the next 2 to 3 days    PATIENT REFERRED TO:  JEANNA Dumont  [address] [facility]  [phone]    In 2 days      DISCHARGE MEDICATIONS:  New Prescriptions    CIPROFLOXACIN (CIPRO) 500 MG TABLET    Take 1 tablet by mouth 2 times daily for 10 days    HYDROCODONE-ACETAMINOPHEN (NORCO) 5-325 MG PER TABLET    Take 1 tablet by mouth every 6 hours as needed for Pain for up to 3 days. Intended supply: 3 days. Take lowest dose possible to manage pain    METRONIDAZOLE (FLAGYL) 500 MG TABLET    Take 1 tablet by mouth 2 times daily for 10 days    ONDANSETRON (ZOFRAN ODT) 4 MG DISINTEGRATING TABLET    Take 1 tablet by mouth every 4 hours as needed for Nausea or Vomiting     Controlled Substances Monitoring:     No flowsheet data found.     (Please note that portions of this note were completed with a voice recognition program.  Efforts were made to edit the dictations but occasionally words are mis-transcribed.)    Abel Boas, DO (electronically signed)  Attending Emergency Physician            Abel Boas, DO  11/08/22 1014

## 2022-11-11 RX ORDER — PROMETHAZINE HYDROCHLORIDE 25 MG/1
25 TABLET ORAL 3 TIMES DAILY PRN
Qty: 12 TABLET | Refills: 0 | Status: SHIPPED | OUTPATIENT
Start: 2022-11-11 | End: 2022-11-18

## 2022-12-07 DIAGNOSIS — F43.23 ADJUSTMENT DISORDER WITH MIXED ANXIETY AND DEPRESSED MOOD: ICD-10-CM

## 2022-12-07 NOTE — TELEPHONE ENCOUNTER
Comments:     Last Office Visit (last PCP visit):   10/10/2022    Next Visit Date:  No future appointments. **If hasn't been seen in over a year OR hasn't followed up according to last diabetes/ADHD visit, make appointment for patient before sending refill to provider.     Rx requested:  Requested Prescriptions     Pending Prescriptions Disp Refills    busPIRone (BUSPAR) 15 MG tablet [Pharmacy Med Name: BUSPIRONE HCL 15 MG TABLET] 90 tablet 3     Sig: take 1 tablet by mouth three times a day

## 2022-12-09 RX ORDER — BUSPIRONE HYDROCHLORIDE 15 MG/1
TABLET ORAL
Qty: 90 TABLET | Refills: 3 | Status: SHIPPED | OUTPATIENT
Start: 2022-12-09

## 2023-05-01 DIAGNOSIS — F43.23 ADJUSTMENT DISORDER WITH MIXED ANXIETY AND DEPRESSED MOOD: ICD-10-CM

## 2023-05-02 RX ORDER — BUSPIRONE HYDROCHLORIDE 15 MG/1
TABLET ORAL
Qty: 90 TABLET | Refills: 1 | Status: SHIPPED | OUTPATIENT
Start: 2023-05-02

## 2023-06-22 ENCOUNTER — OFFICE VISIT (OUTPATIENT)
Dept: FAMILY MEDICINE CLINIC | Age: 58
End: 2023-06-22
Payer: COMMERCIAL

## 2023-06-22 VITALS
SYSTOLIC BLOOD PRESSURE: 108 MMHG | WEIGHT: 196.6 LBS | DIASTOLIC BLOOD PRESSURE: 64 MMHG | BODY MASS INDEX: 34.83 KG/M2 | TEMPERATURE: 97.9 F | HEART RATE: 56 BPM | OXYGEN SATURATION: 95 %

## 2023-06-22 DIAGNOSIS — J01.40 ACUTE NON-RECURRENT PANSINUSITIS: ICD-10-CM

## 2023-06-22 DIAGNOSIS — J02.9 SORE THROAT: ICD-10-CM

## 2023-06-22 DIAGNOSIS — H65.03 NON-RECURRENT ACUTE SEROUS OTITIS MEDIA OF BOTH EARS: Primary | ICD-10-CM

## 2023-06-22 LAB
Lab: NORMAL
PERFORMING INSTRUMENT: NORMAL
QC PASS/FAIL: NORMAL
S PYO AG THROAT QL: NORMAL
SARS-COV-2, POC: NORMAL

## 2023-06-22 PROCEDURE — 3017F COLORECTAL CA SCREEN DOC REV: CPT | Performed by: NURSE PRACTITIONER

## 2023-06-22 PROCEDURE — G8417 CALC BMI ABV UP PARAM F/U: HCPCS | Performed by: NURSE PRACTITIONER

## 2023-06-22 PROCEDURE — 87880 STREP A ASSAY W/OPTIC: CPT | Performed by: NURSE PRACTITIONER

## 2023-06-22 PROCEDURE — 3078F DIAST BP <80 MM HG: CPT | Performed by: NURSE PRACTITIONER

## 2023-06-22 PROCEDURE — G8427 DOCREV CUR MEDS BY ELIG CLIN: HCPCS | Performed by: NURSE PRACTITIONER

## 2023-06-22 PROCEDURE — 1036F TOBACCO NON-USER: CPT | Performed by: NURSE PRACTITIONER

## 2023-06-22 PROCEDURE — 87426 SARSCOV CORONAVIRUS AG IA: CPT | Performed by: NURSE PRACTITIONER

## 2023-06-22 PROCEDURE — 3074F SYST BP LT 130 MM HG: CPT | Performed by: NURSE PRACTITIONER

## 2023-06-22 PROCEDURE — 99213 OFFICE O/P EST LOW 20 MIN: CPT | Performed by: NURSE PRACTITIONER

## 2023-06-22 RX ORDER — FLUTICASONE PROPIONATE 50 MCG
1 SPRAY, SUSPENSION (ML) NASAL DAILY
Qty: 1 EACH | Refills: 1 | Status: SHIPPED | OUTPATIENT
Start: 2023-06-22

## 2023-06-22 RX ORDER — CETIRIZINE HYDROCHLORIDE 10 MG/1
10 TABLET ORAL DAILY
Qty: 30 TABLET | Refills: 0 | Status: SHIPPED | OUTPATIENT
Start: 2023-06-22 | End: 2023-07-22

## 2023-06-22 SDOH — ECONOMIC STABILITY: HOUSING INSECURITY
IN THE LAST 12 MONTHS, WAS THERE A TIME WHEN YOU DID NOT HAVE A STEADY PLACE TO SLEEP OR SLEPT IN A SHELTER (INCLUDING NOW)?: NO

## 2023-06-22 SDOH — ECONOMIC STABILITY: INCOME INSECURITY: HOW HARD IS IT FOR YOU TO PAY FOR THE VERY BASICS LIKE FOOD, HOUSING, MEDICAL CARE, AND HEATING?: NOT HARD AT ALL

## 2023-06-22 SDOH — ECONOMIC STABILITY: FOOD INSECURITY: WITHIN THE PAST 12 MONTHS, THE FOOD YOU BOUGHT JUST DIDN'T LAST AND YOU DIDN'T HAVE MONEY TO GET MORE.: NEVER TRUE

## 2023-06-22 SDOH — ECONOMIC STABILITY: FOOD INSECURITY: WITHIN THE PAST 12 MONTHS, YOU WORRIED THAT YOUR FOOD WOULD RUN OUT BEFORE YOU GOT MONEY TO BUY MORE.: NEVER TRUE

## 2023-06-22 ASSESSMENT — ENCOUNTER SYMPTOMS
WHEEZING: 0
EYE REDNESS: 0
EYE ITCHING: 0
SHORTNESS OF BREATH: 0
RHINORRHEA: 1
SINUS PRESSURE: 1
COUGH: 1
EYE DISCHARGE: 0
HOARSE VOICE: 1

## 2023-06-22 ASSESSMENT — PATIENT HEALTH QUESTIONNAIRE - PHQ9
2. FEELING DOWN, DEPRESSED OR HOPELESS: 0
SUM OF ALL RESPONSES TO PHQ9 QUESTIONS 1 & 2: 0
1. LITTLE INTEREST OR PLEASURE IN DOING THINGS: 0
SUM OF ALL RESPONSES TO PHQ QUESTIONS 1-9: 0

## 2023-06-22 NOTE — PROGRESS NOTES
Adela Villarreal (:  1965) is a 59 y.o. female, Established patient, here for evaluation of the following chief complaint(s):  Pharyngitis (X 4 days, cough, sinus drainage, bilateral ears clogged)      Vitals:    23 1634   BP: 108/64   Pulse: 56   Temp: 97.9 °F (36.6 °C)   SpO2: 95%       ASSESSMENT/PLAN:  1. Non-recurrent acute serous otitis media of both ears  -     fluticasone (FLONASE) 50 MCG/ACT nasal spray; 1 spray by Nasal route daily, Disp-1 each, R-1Normal  -     cetirizine (ZYRTEC) 10 MG tablet; Take 1 tablet by mouth daily, Disp-30 tablet, R-0Normal  2. Acute non-recurrent pansinusitis  -     fluticasone (FLONASE) 50 MCG/ACT nasal spray; 1 spray by Nasal route daily, Disp-1 each, R-1Normal  -     cetirizine (ZYRTEC) 10 MG tablet; Take 1 tablet by mouth daily, Disp-30 tablet, R-0Normal  3. Sore throat  -     POCT COVID-19, Antigen  -     POCT rapid strep A                        Neg for both      Return if symptoms worsen or fail to improve. SUBJECTIVE/OBJECTIVE:    Sinusitis  This is a new problem. Episode onset: x4 days cough, sinus pressure, ear pressure and clogged bilaterally. The problem has been gradually worsening since onset. There has been no fever. Her pain is at a severity of 3/10. Associated symptoms include congestion, coughing, ear pain (bilateral), a hoarse voice and sinus pressure. Pertinent negatives include no shortness of breath. Past treatments include nothing. Review of Systems   HENT:  Positive for congestion, ear pain (bilateral), hoarse voice, postnasal drip, rhinorrhea and sinus pressure. Eyes:  Negative for discharge, redness and itching. Respiratory:  Positive for cough. Negative for shortness of breath and wheezing. Cardiovascular:  Negative for palpitations. Neurological:  Negative for dizziness and light-headedness. Physical Exam  Vitals reviewed. Constitutional:       General: She is not in acute distress.      Appearance: Normal

## 2023-06-27 ENCOUNTER — OFFICE VISIT (OUTPATIENT)
Dept: FAMILY MEDICINE CLINIC | Age: 58
End: 2023-06-27
Payer: COMMERCIAL

## 2023-06-27 VITALS
BODY MASS INDEX: 34.61 KG/M2 | WEIGHT: 195.4 LBS | HEART RATE: 63 BPM | DIASTOLIC BLOOD PRESSURE: 74 MMHG | SYSTOLIC BLOOD PRESSURE: 118 MMHG | RESPIRATION RATE: 14 BRPM | OXYGEN SATURATION: 99 % | TEMPERATURE: 97 F

## 2023-06-27 DIAGNOSIS — R05.1 ACUTE COUGH: ICD-10-CM

## 2023-06-27 DIAGNOSIS — J01.40 ACUTE NON-RECURRENT PANSINUSITIS: ICD-10-CM

## 2023-06-27 DIAGNOSIS — H10.13 ALLERGIC CONJUNCTIVITIS OF BOTH EYES: ICD-10-CM

## 2023-06-27 DIAGNOSIS — J45.31 MILD PERSISTENT ASTHMA WITH EXACERBATION: Primary | ICD-10-CM

## 2023-06-27 DIAGNOSIS — R06.2 WHEEZING: ICD-10-CM

## 2023-06-27 PROCEDURE — 3017F COLORECTAL CA SCREEN DOC REV: CPT | Performed by: NURSE PRACTITIONER

## 2023-06-27 PROCEDURE — 99214 OFFICE O/P EST MOD 30 MIN: CPT | Performed by: NURSE PRACTITIONER

## 2023-06-27 PROCEDURE — G8427 DOCREV CUR MEDS BY ELIG CLIN: HCPCS | Performed by: NURSE PRACTITIONER

## 2023-06-27 PROCEDURE — 3078F DIAST BP <80 MM HG: CPT | Performed by: NURSE PRACTITIONER

## 2023-06-27 PROCEDURE — 3074F SYST BP LT 130 MM HG: CPT | Performed by: NURSE PRACTITIONER

## 2023-06-27 PROCEDURE — 1036F TOBACCO NON-USER: CPT | Performed by: NURSE PRACTITIONER

## 2023-06-27 PROCEDURE — G8417 CALC BMI ABV UP PARAM F/U: HCPCS | Performed by: NURSE PRACTITIONER

## 2023-06-27 RX ORDER — AZITHROMYCIN 250 MG/1
TABLET, FILM COATED ORAL
Qty: 6 TABLET | Refills: 0 | Status: SHIPPED | OUTPATIENT
Start: 2023-06-27 | End: 2023-07-02

## 2023-06-27 RX ORDER — AZELASTINE HYDROCHLORIDE 0.5 MG/ML
1 SOLUTION/ DROPS OPHTHALMIC 2 TIMES DAILY
Qty: 1 EACH | Refills: 0 | Status: SHIPPED | OUTPATIENT
Start: 2023-06-27

## 2023-06-27 RX ORDER — METHYLPREDNISOLONE 4 MG/1
TABLET ORAL
Qty: 1 KIT | Refills: 0 | Status: SHIPPED | OUTPATIENT
Start: 2023-06-27

## 2023-06-27 RX ORDER — MONTELUKAST SODIUM 10 MG/1
10 TABLET ORAL NIGHTLY
Qty: 30 TABLET | Refills: 0 | Status: SHIPPED | OUTPATIENT
Start: 2023-06-27

## 2023-06-27 ASSESSMENT — ENCOUNTER SYMPTOMS
BACK PAIN: 0
SORE THROAT: 0
COUGH: 1
VOMITING: 0
CHEST TIGHTNESS: 1
RHINORRHEA: 1
DIARRHEA: 0
ABDOMINAL PAIN: 0
NAUSEA: 0
WHEEZING: 1
HOARSE VOICE: 1
SHORTNESS OF BREATH: 1
SINUS PRESSURE: 1

## 2023-07-02 DIAGNOSIS — F43.23 ADJUSTMENT DISORDER WITH MIXED ANXIETY AND DEPRESSED MOOD: ICD-10-CM

## 2023-07-03 RX ORDER — BUSPIRONE HYDROCHLORIDE 15 MG/1
TABLET ORAL
Qty: 90 TABLET | Refills: 1 | Status: SHIPPED | OUTPATIENT
Start: 2023-07-03

## 2023-07-03 NOTE — TELEPHONE ENCOUNTER
Comments:     Last Office Visit (last PCP visit):   10/10/2022    Next Visit Date:  No future appointments. **If hasn't been seen in over a year OR hasn't followed up according to last diabetes/ADHD visit, make appointment for patient before sending refill to provider.     Rx requested:  Requested Prescriptions     Pending Prescriptions Disp Refills    busPIRone (BUSPAR) 15 MG tablet [Pharmacy Med Name: BUSPIRONE HCL 15 MG TABLET] 90 tablet 1     Sig: take 1 tablet by mouth three times a day

## 2023-07-27 DIAGNOSIS — F43.23 ADJUSTMENT DISORDER WITH MIXED ANXIETY AND DEPRESSED MOOD: ICD-10-CM

## 2023-07-28 RX ORDER — PAROXETINE HYDROCHLORIDE 40 MG/1
TABLET, FILM COATED ORAL
Qty: 90 TABLET | Refills: 1 | Status: SHIPPED | OUTPATIENT
Start: 2023-07-28

## 2023-07-28 NOTE — TELEPHONE ENCOUNTER
Comments:     Last Office Visit (last PCP visit):   10/10/2022    Next Visit Date:  No future appointments. **If hasn't been seen in over a year OR hasn't followed up according to last diabetes/ADHD visit, make appointment for patient before sending refill to provider.     Rx requested:  Requested Prescriptions     Pending Prescriptions Disp Refills    PARoxetine (PAXIL) 40 MG tablet 90 tablet 2     Sig: take 1 tablet by mouth every morning

## 2023-08-28 DIAGNOSIS — F43.23 ADJUSTMENT DISORDER WITH MIXED ANXIETY AND DEPRESSED MOOD: ICD-10-CM

## 2023-08-28 RX ORDER — BUSPIRONE HYDROCHLORIDE 15 MG/1
TABLET ORAL
Qty: 90 TABLET | Refills: 0 | Status: SHIPPED | OUTPATIENT
Start: 2023-08-28 | End: 2023-09-29

## 2023-09-28 DIAGNOSIS — F43.23 ADJUSTMENT DISORDER WITH MIXED ANXIETY AND DEPRESSED MOOD: ICD-10-CM

## 2023-09-28 NOTE — TELEPHONE ENCOUNTER
Comments:     Last Office Visit (last PCP visit):   10/10/2022    Next Visit Date:  No future appointments. **If hasn't been seen in over a year OR hasn't followed up according to last diabetes/ADHD visit, make appointment for patient before sending refill to provider.     Rx requested:  Requested Prescriptions     Pending Prescriptions Disp Refills    busPIRone (BUSPAR) 15 MG tablet [Pharmacy Med Name: BUSPIRONE HCL 15 MG TABLET] 90 tablet 0     Sig: take 1 tablet by mouth three times a day

## 2023-09-29 RX ORDER — BUSPIRONE HYDROCHLORIDE 15 MG/1
TABLET ORAL
Qty: 90 TABLET | Refills: 0 | Status: SHIPPED | OUTPATIENT
Start: 2023-09-29

## 2023-10-29 DIAGNOSIS — F43.23 ADJUSTMENT DISORDER WITH MIXED ANXIETY AND DEPRESSED MOOD: ICD-10-CM

## 2023-10-30 NOTE — TELEPHONE ENCOUNTER
Comments:     Last Office Visit (last PCP visit):   10/10/2022    Next Visit Date:  Future Appointments   Date Time Provider [address]   11/10/2023  8:30 AM Iveth Green PA [facility]       **If hasn't been seen in over a year OR hasn't followed up according to last diabetes/ADHD visit, make appointment for patient before sending refill to provider.     Rx requested:  Requested Prescriptions     Pending Prescriptions Disp Refills    busPIRone (BUSPAR) 15 MG tablet [Pharmacy Med Name: BUSPIRONE HCL 15 MG TABLET] 90 tablet 0     Sig: take 1 tablet by mouth three times a day

## 2023-10-31 RX ORDER — BUSPIRONE HYDROCHLORIDE 15 MG/1
TABLET ORAL
Qty: 90 TABLET | Refills: 0 | Status: SHIPPED | OUTPATIENT
Start: 2023-10-31 | End: 2023-12-05

## 2023-11-10 ENCOUNTER — OFFICE VISIT (OUTPATIENT)
Dept: INTERNAL MEDICINE | Age: 58
End: 2023-11-10

## 2023-11-10 VITALS
TEMPERATURE: 97.1 F | DIASTOLIC BLOOD PRESSURE: 72 MMHG | SYSTOLIC BLOOD PRESSURE: 110 MMHG | BODY MASS INDEX: 37.69 KG/M2 | OXYGEN SATURATION: 97 % | HEART RATE: 63 BPM | HEIGHT: 61 IN | WEIGHT: 199.6 LBS

## 2023-11-10 DIAGNOSIS — Z13.220 LIPID SCREENING: ICD-10-CM

## 2023-11-10 DIAGNOSIS — M85.80 OSTEOPENIA, UNSPECIFIED LOCATION: ICD-10-CM

## 2023-11-10 DIAGNOSIS — Z23 NEED FOR INFLUENZA VACCINATION: ICD-10-CM

## 2023-11-10 DIAGNOSIS — F43.23 ADJUSTMENT DISORDER WITH MIXED ANXIETY AND DEPRESSED MOOD: ICD-10-CM

## 2023-11-10 DIAGNOSIS — I10 ESSENTIAL HYPERTENSION: ICD-10-CM

## 2023-11-10 DIAGNOSIS — Z13.1 SCREENING FOR DIABETES MELLITUS: ICD-10-CM

## 2023-11-10 DIAGNOSIS — Z00.00 ENCOUNTER FOR WELL ADULT EXAM WITHOUT ABNORMAL FINDINGS: Primary | ICD-10-CM

## 2023-11-10 DIAGNOSIS — E66.09 CLASS 2 OBESITY DUE TO EXCESS CALORIES WITHOUT SERIOUS COMORBIDITY WITH BODY MASS INDEX (BMI) OF 37.0 TO 37.9 IN ADULT: ICD-10-CM

## 2023-11-10 DIAGNOSIS — Z12.31 ENCOUNTER FOR SCREENING MAMMOGRAM FOR MALIGNANT NEOPLASM OF BREAST: ICD-10-CM

## 2023-11-10 DIAGNOSIS — Z00.00 ENCOUNTER FOR WELL ADULT EXAM WITHOUT ABNORMAL FINDINGS: ICD-10-CM

## 2023-11-10 DIAGNOSIS — F41.1 GAD (GENERALIZED ANXIETY DISORDER): ICD-10-CM

## 2023-11-10 PROBLEM — R07.9 CHEST PAIN IN ADULT: Status: RESOLVED | Noted: 2017-11-06 | Resolved: 2023-11-10

## 2023-11-10 LAB
ALBUMIN SERPL-MCNC: 4.2 G/DL (ref 3.5–4.6)
ALP SERPL-CCNC: 66 U/L (ref 40–130)
ALT SERPL-CCNC: 19 U/L (ref 0–33)
ANION GAP SERPL CALCULATED.3IONS-SCNC: 7 MEQ/L (ref 9–15)
AST SERPL-CCNC: 19 U/L (ref 0–35)
BASOPHILS # BLD: 0 K/UL (ref 0–0.2)
BASOPHILS NFR BLD: 0.5 %
BILIRUB SERPL-MCNC: 0.8 MG/DL (ref 0.2–0.7)
BUN SERPL-MCNC: 18 MG/DL (ref 8–23)
CALCIUM SERPL-MCNC: 9.3 MG/DL (ref 8.5–9.9)
CHLORIDE SERPL-SCNC: 106 MEQ/L (ref 95–107)
CHOLEST SERPL-MCNC: 202 MG/DL (ref 0–199)
CO2 SERPL-SCNC: 27 MEQ/L (ref 20–31)
CREAT SERPL-MCNC: 0.99 MG/DL (ref 0.5–0.9)
EOSINOPHIL # BLD: 0.1 K/UL (ref 0–0.7)
EOSINOPHIL NFR BLD: 2 %
ERYTHROCYTE [DISTWIDTH] IN BLOOD BY AUTOMATED COUNT: 13.2 % (ref 11.5–14.5)
GLOBULIN SER CALC-MCNC: 2.5 G/DL (ref 2.3–3.5)
GLUCOSE SERPL-MCNC: 98 MG/DL (ref 70–99)
HBA1C MFR BLD: 5.7 % (ref 4.8–5.9)
HCT VFR BLD AUTO: 43.5 % (ref 37–47)
HDLC SERPL-MCNC: 55 MG/DL (ref 40–59)
HGB BLD-MCNC: 14 G/DL (ref 12–16)
LDLC SERPL CALC-MCNC: 129 MG/DL (ref 0–129)
LYMPHOCYTES # BLD: 2 K/UL (ref 1–4.8)
LYMPHOCYTES NFR BLD: 29.6 %
MCH RBC QN AUTO: 29.7 PG (ref 27–31.3)
MCHC RBC AUTO-ENTMCNC: 32.2 % (ref 33–37)
MCV RBC AUTO: 92.4 FL (ref 79.4–94.8)
MONOCYTES # BLD: 0.6 K/UL (ref 0.2–0.8)
MONOCYTES NFR BLD: 9.3 %
NEUTROPHILS # BLD: 3.9 K/UL (ref 1.4–6.5)
NEUTS SEG NFR BLD: 58.4 %
PLATELET # BLD AUTO: 260 K/UL (ref 130–400)
POTASSIUM SERPL-SCNC: 5 MEQ/L (ref 3.4–4.9)
PROT SERPL-MCNC: 6.7 G/DL (ref 6.3–8)
RBC # BLD AUTO: 4.71 M/UL (ref 4.2–5.4)
SODIUM SERPL-SCNC: 140 MEQ/L (ref 135–144)
TRIGL SERPL-MCNC: 89 MG/DL (ref 0–150)
WBC # BLD AUTO: 6.6 K/UL (ref 4.8–10.8)

## 2023-11-10 RX ORDER — PAROXETINE HYDROCHLORIDE 40 MG/1
TABLET, FILM COATED ORAL
Qty: 90 TABLET | Refills: 3 | Status: SHIPPED | OUTPATIENT
Start: 2023-11-10

## 2023-11-10 NOTE — PROGRESS NOTES
Well Adult Note  Name: Madelyn Keane Date: 11/10/2023   MRN: [id number] Sex: Female   Age: 59 y.o. Ethnicity: Non- / Non    : 1965 Race: White (non-)      Ricky Young is here for well adult exam.  History:  New concerns - none   Smoker -  none   Alcohol consumption-  none   Regular exercise -  Yes: 2 days a week cardio drumming       Review of Systems   All other systems reviewed and are negative. Allergies   Allergen Reactions    Sulfa Antibiotics      hives    Other Rash    Penicillins Rash     hives         Prior to Visit Medications    Medication Sig Taking? Authorizing Provider   PARoxetine (PAXIL) 40 MG tablet take 1 tablet by mouth every morning Yes Mitterling, Darryle Dolly, PA   busPIRone (BUSPAR) 15 MG tablet take 1 tablet by mouth three times a day Yes Autumn Green PA   montelukast (SINGULAIR) 10 MG tablet Take 1 tablet by mouth nightly Yes Katherine Blackman APRN   ondansetron (ZOFRAN ODT) 4 MG disintegrating tablet Take 1 tablet by mouth every 4 hours as needed for Nausea or Vomiting Yes Sherita Alegria DO   traZODone (DESYREL) 50 MG tablet Take 1 tablet by mouth nightly Yes Mitterling, Darryle Dolly, PA   methylPREDNISolone (MEDROL, ARMAND,) 4 MG tablet Take by mouth.   Patient not taking: Reported on 11/10/2023  ROSANNE Herrera   azelastine (OPTIVAR) 0.05 % ophthalmic solution Place 1 drop into both eyes 2 times daily  Patient not taking: Reported on 11/10/2023  ROSANNE Herrera   fluticasone [facility]) 50 MCG/ACT nasal spray 1 spray by Nasal route daily  Patient not taking: Reported on 2023  ROSANNE Herrera   fluticasone [facility]) 50 MCG/ACT nasal spray 1 spray by Nasal route daily  Patient not taking: Reported on 2023  ROSANNE Herrera         Past Medical History:   Diagnosis Date    Anxiety     Asthma Child    GERD (gastroesophageal reflux disease) 34 yrs old    Polyps    Obesity Adult life    Would like to

## 2023-11-10 NOTE — PROGRESS NOTES
After obtaining consent, and per orders of Saúl Lainez, injection of flu vaccine given in Left deltoid by Hemant Smith MA. Patient instructed to remain in clinic for 20 minutes afterwards, and to report any adverse reaction to me immediately.

## 2023-12-03 DIAGNOSIS — F43.23 ADJUSTMENT DISORDER WITH MIXED ANXIETY AND DEPRESSED MOOD: ICD-10-CM

## 2023-12-04 DIAGNOSIS — F43.23 ADJUSTMENT DISORDER WITH MIXED ANXIETY AND DEPRESSED MOOD: ICD-10-CM

## 2023-12-04 DIAGNOSIS — J45.31 MILD PERSISTENT ASTHMA WITH EXACERBATION: ICD-10-CM

## 2023-12-04 NOTE — TELEPHONE ENCOUNTER
Comments:     Last Office Visit (last PCP visit):   11/10/2023    Next Visit Date:  Future Appointments   Date Time Provider [address]   1/6/2024  9:30 AM [facility]       **If hasn't been seen in over a year OR hasn't followed up according to last diabetes/ADHD visit, make appointment for patient before sending refill to provider.     Rx requested:  Requested Prescriptions     Pending Prescriptions Disp Refills    montelukast (SINGULAIR) 10 MG tablet 30 tablet 0     Sig: Take 1 tablet by mouth nightly    traZODone (DESYREL) 50 MG tablet 90 tablet 2     Sig: Take 1 tablet by mouth nightly

## 2023-12-04 NOTE — TELEPHONE ENCOUNTER
Comments:     Last Office Visit (last PCP visit):   11/10/2023    Next Visit Date:  Future Appointments   Date Time Provider [address]   1/6/2024  9:30 AM [facility]  [facility]       **If hasn't been seen in over a year OR hasn't followed up according to last diabetes/ADHD visit, make appointment for patient before sending refill to provider.     Rx requested:  Requested Prescriptions     Pending Prescriptions Disp Refills    busPIRone (BUSPAR) 15 MG tablet [Pharmacy Med Name: BUSPIRONE HCL 15 MG TABLET] 90 tablet 0     Sig: take 1 tablet by mouth three times a day

## 2023-12-05 RX ORDER — BUSPIRONE HYDROCHLORIDE 15 MG/1
TABLET ORAL
Qty: 90 TABLET | Refills: 0 | Status: SHIPPED | OUTPATIENT
Start: 2023-12-05

## 2023-12-07 RX ORDER — TRAZODONE HYDROCHLORIDE 50 MG/1
50 TABLET ORAL NIGHTLY
Qty: 90 TABLET | Refills: 2 | Status: SHIPPED | OUTPATIENT
Start: 2023-12-07

## 2023-12-07 RX ORDER — MONTELUKAST SODIUM 10 MG/1
10 TABLET ORAL NIGHTLY
Qty: 30 TABLET | Refills: 0 | Status: SHIPPED | OUTPATIENT
Start: 2023-12-07

## 2023-12-08 DIAGNOSIS — J45.31 MILD PERSISTENT ASTHMA WITH EXACERBATION: ICD-10-CM

## 2023-12-08 NOTE — TELEPHONE ENCOUNTER
Comments: Mail order is asking for 90 day rx.    Last Office Visit (last PCP visit):   11/10/2023    Next Visit Date:  Future Appointments   Date Time Provider Department Center   1/6/2024  9:30 AM [facility]  [facility]       **If hasn't been seen in over a year OR hasn't followed up according to last diabetes/ADHD visit, make appointment for patient before sending refill to provider.    Rx requested:  Requested Prescriptions     Pending Prescriptions Disp Refills    montelukast (SINGULAIR) 10 MG tablet 90 tablet 0     Sig: Take 1 tablet by mouth nightly

## 2023-12-11 RX ORDER — MONTELUKAST SODIUM 10 MG/1
10 TABLET ORAL NIGHTLY
Qty: 90 TABLET | Refills: 0 | Status: SHIPPED | OUTPATIENT
Start: 2023-12-11

## 2024-01-06 ENCOUNTER — HOSPITAL ENCOUNTER (OUTPATIENT)
Dept: WOMENS IMAGING | Age: 59
End: 2024-01-06
Payer: COMMERCIAL

## 2024-01-06 DIAGNOSIS — Z12.31 ENCOUNTER FOR SCREENING MAMMOGRAM FOR MALIGNANT NEOPLASM OF BREAST: ICD-10-CM

## 2024-01-06 PROCEDURE — 77063 BREAST TOMOSYNTHESIS BI: CPT

## 2024-03-29 ENCOUNTER — OFFICE VISIT (OUTPATIENT)
Dept: INTERNAL MEDICINE | Age: 59
End: 2024-03-29
Payer: COMMERCIAL

## 2024-03-29 VITALS
DIASTOLIC BLOOD PRESSURE: 78 MMHG | BODY MASS INDEX: 37.95 KG/M2 | HEART RATE: 60 BPM | OXYGEN SATURATION: 96 % | SYSTOLIC BLOOD PRESSURE: 130 MMHG | TEMPERATURE: 97.3 F | HEIGHT: 61 IN | WEIGHT: 201 LBS

## 2024-03-29 DIAGNOSIS — E66.01 CLASS 2 SEVERE OBESITY DUE TO EXCESS CALORIES WITH SERIOUS COMORBIDITY AND BODY MASS INDEX (BMI) OF 37.0 TO 37.9 IN ADULT (HCC): Primary | ICD-10-CM

## 2024-03-29 PROCEDURE — 3078F DIAST BP <80 MM HG: CPT | Performed by: PHYSICIAN ASSISTANT

## 2024-03-29 PROCEDURE — G0447 BEHAVIOR COUNSEL OBESITY 15M: HCPCS | Performed by: PHYSICIAN ASSISTANT

## 2024-03-29 PROCEDURE — 3075F SYST BP GE 130 - 139MM HG: CPT | Performed by: PHYSICIAN ASSISTANT

## 2024-03-29 PROCEDURE — G8427 DOCREV CUR MEDS BY ELIG CLIN: HCPCS | Performed by: PHYSICIAN ASSISTANT

## 2024-03-29 PROCEDURE — G8417 CALC BMI ABV UP PARAM F/U: HCPCS | Performed by: PHYSICIAN ASSISTANT

## 2024-03-29 PROCEDURE — 99213 OFFICE O/P EST LOW 20 MIN: CPT | Performed by: PHYSICIAN ASSISTANT

## 2024-03-29 PROCEDURE — G8482 FLU IMMUNIZE ORDER/ADMIN: HCPCS | Performed by: PHYSICIAN ASSISTANT

## 2024-03-29 PROCEDURE — 1036F TOBACCO NON-USER: CPT | Performed by: PHYSICIAN ASSISTANT

## 2024-03-29 PROCEDURE — 3017F COLORECTAL CA SCREEN DOC REV: CPT | Performed by: PHYSICIAN ASSISTANT

## 2024-03-29 RX ORDER — TIRZEPATIDE 2.5 MG/.5ML
2.5 INJECTION, SOLUTION SUBCUTANEOUS WEEKLY
Qty: 2 ML | Refills: 0 | Status: SHIPPED | OUTPATIENT
Start: 2024-03-29 | End: 2024-03-29

## 2024-03-29 RX ORDER — TIRZEPATIDE 2.5 MG/.5ML
2.5 INJECTION, SOLUTION SUBCUTANEOUS WEEKLY
Qty: 2 ML | Refills: 0 | Status: SHIPPED | OUTPATIENT
Start: 2024-03-29 | End: 2024-04-02

## 2024-03-29 ASSESSMENT — PATIENT HEALTH QUESTIONNAIRE - PHQ9
2. FEELING DOWN, DEPRESSED OR HOPELESS: NOT AT ALL
SUM OF ALL RESPONSES TO PHQ QUESTIONS 1-9: 0
SUM OF ALL RESPONSES TO PHQ QUESTIONS 1-9: 0
1. LITTLE INTEREST OR PLEASURE IN DOING THINGS: NOT AT ALL
SUM OF ALL RESPONSES TO PHQ9 QUESTIONS 1 & 2: 0
SUM OF ALL RESPONSES TO PHQ QUESTIONS 1-9: 0
SUM OF ALL RESPONSES TO PHQ QUESTIONS 1-9: 0

## 2024-03-29 NOTE — PROGRESS NOTES
Mireille Carr (: 1965) is a 58 y.o. female, Established patient, here for evaluation of the following chief complaint(s):  Weight Management (Would like to discuss her options for weightloss)        ASSESSMENT/PLAN:  1. Class 2 severe obesity due to excess calories with serious comorbidity and body mass index (BMI) of 37.0 to 37.9 in adult (HCC)  - Zepbound was not covered by insurance   - Semaglutide,0.25 or 0.5MG/DOS, 2 MG/1.5ML SOPN; Inject 0.3 mg into the skin once a week  Dispense: 4 Adjustable Dose Pre-filled Pen Syringe; Refill: 0  - plan to start compound Semaglutide   - exercise strongly advised, shooting for at least 150 min a week, and increasing as tolerated.  Weight training can help maintain muscle mass  - aim for daily protein intake of 100 gram or more   - sleeping 6 hrs or more a night is important to weight loss   - minimum daily calorie intake is 1200  - water intake should be half the the weight number in oz   - follow up in 4 weeks    Obesity Counseling: Assessed behavioral health risks and factors affecting choice of behavior. Suggested weight control approaches, including dietary changes behavioral modification and follow up plan. Provided educational and support documentation.  Time spent (minutes): 15    No follow-ups on file.    SUBJECTIVE/OBJECTIVE:  HPI      Obesity  Here to discuss her weight  Current BMI- 37.98  Exercise-  cardio drumming , Julia  Diet- WW points diet   What she has tried in the past on WW and higher protein     Review of Systems   All other systems reviewed and are negative.      Physical Exam  Vitals reviewed.   Constitutional:       Appearance: Normal appearance. She is obese.   Cardiovascular:      Rate and Rhythm: Normal rate.      Heart sounds: Normal heart sounds.   Pulmonary:      Effort: Pulmonary effort is normal.      Breath sounds: Normal breath sounds.   Neurological:      General: No focal deficit present.      Mental Status: She is alert.

## 2024-04-25 ENCOUNTER — OFFICE VISIT (OUTPATIENT)
Dept: INTERNAL MEDICINE | Age: 59
End: 2024-04-25
Payer: COMMERCIAL

## 2024-04-25 VITALS
DIASTOLIC BLOOD PRESSURE: 74 MMHG | HEART RATE: 61 BPM | HEIGHT: 61 IN | OXYGEN SATURATION: 98 % | SYSTOLIC BLOOD PRESSURE: 110 MMHG | TEMPERATURE: 98.4 F | BODY MASS INDEX: 36.63 KG/M2 | RESPIRATION RATE: 16 BRPM | WEIGHT: 194 LBS

## 2024-04-25 DIAGNOSIS — E66.09 CLASS 2 OBESITY DUE TO EXCESS CALORIES WITHOUT SERIOUS COMORBIDITY WITH BODY MASS INDEX (BMI) OF 37.0 TO 37.9 IN ADULT: Primary | ICD-10-CM

## 2024-04-25 PROCEDURE — 3074F SYST BP LT 130 MM HG: CPT | Performed by: PHYSICIAN ASSISTANT

## 2024-04-25 PROCEDURE — 1036F TOBACCO NON-USER: CPT | Performed by: PHYSICIAN ASSISTANT

## 2024-04-25 PROCEDURE — G8427 DOCREV CUR MEDS BY ELIG CLIN: HCPCS | Performed by: PHYSICIAN ASSISTANT

## 2024-04-25 PROCEDURE — 99213 OFFICE O/P EST LOW 20 MIN: CPT | Performed by: PHYSICIAN ASSISTANT

## 2024-04-25 PROCEDURE — G8417 CALC BMI ABV UP PARAM F/U: HCPCS | Performed by: PHYSICIAN ASSISTANT

## 2024-04-25 PROCEDURE — 3078F DIAST BP <80 MM HG: CPT | Performed by: PHYSICIAN ASSISTANT

## 2024-04-25 PROCEDURE — 3017F COLORECTAL CA SCREEN DOC REV: CPT | Performed by: PHYSICIAN ASSISTANT

## 2024-05-23 DIAGNOSIS — E66.09 CLASS 2 OBESITY DUE TO EXCESS CALORIES WITHOUT SERIOUS COMORBIDITY WITH BODY MASS INDEX (BMI) OF 37.0 TO 37.9 IN ADULT: ICD-10-CM

## 2024-05-24 NOTE — TELEPHONE ENCOUNTER
Comments:     Last Office Visit (last PCP visit):   4/25/2024    Next Visit Date:  Future Appointments   Date Time Provider Department Center   6/25/2024  3:30 PM Autumn Green PA [facility]   7/8/2024  2:45 PM Lilly, Dean K, MD [place] [facility]       **If hasn't been seen in over a year OR hasn't followed up according to last diabetes/ADHD visit, make appointment for patient before sending refill to provider.    Rx requested:  Requested Prescriptions     Pending Prescriptions Disp Refills    Semaglutide,0.25 or 0.5MG/DOS, 2 MG/1.5ML SOPN 4 Adjustable Dose Pre-filled Pen Syringe 0     Sig: Inject 0.6 mg into the skin once a week COMPOUND MEDICATION

## 2024-06-24 ENCOUNTER — OFFICE VISIT (OUTPATIENT)
Dept: INTERNAL MEDICINE | Age: 59
End: 2024-06-24
Payer: COMMERCIAL

## 2024-06-24 VITALS
HEIGHT: 61 IN | WEIGHT: 181 LBS | TEMPERATURE: 97.3 F | BODY MASS INDEX: 34.17 KG/M2 | DIASTOLIC BLOOD PRESSURE: 72 MMHG | SYSTOLIC BLOOD PRESSURE: 108 MMHG | HEART RATE: 71 BPM | OXYGEN SATURATION: 95 %

## 2024-06-24 DIAGNOSIS — E66.09 CLASS 2 OBESITY DUE TO EXCESS CALORIES WITHOUT SERIOUS COMORBIDITY WITH BODY MASS INDEX (BMI) OF 37.0 TO 37.9 IN ADULT: ICD-10-CM

## 2024-06-24 PROCEDURE — 99213 OFFICE O/P EST LOW 20 MIN: CPT | Performed by: PHYSICIAN ASSISTANT

## 2024-06-24 PROCEDURE — 3074F SYST BP LT 130 MM HG: CPT | Performed by: PHYSICIAN ASSISTANT

## 2024-06-24 PROCEDURE — G8427 DOCREV CUR MEDS BY ELIG CLIN: HCPCS | Performed by: PHYSICIAN ASSISTANT

## 2024-06-24 PROCEDURE — 3017F COLORECTAL CA SCREEN DOC REV: CPT | Performed by: PHYSICIAN ASSISTANT

## 2024-06-24 PROCEDURE — G8417 CALC BMI ABV UP PARAM F/U: HCPCS | Performed by: PHYSICIAN ASSISTANT

## 2024-06-24 PROCEDURE — 1036F TOBACCO NON-USER: CPT | Performed by: PHYSICIAN ASSISTANT

## 2024-06-24 PROCEDURE — 3078F DIAST BP <80 MM HG: CPT | Performed by: PHYSICIAN ASSISTANT

## 2024-06-24 SDOH — ECONOMIC STABILITY: FOOD INSECURITY: WITHIN THE PAST 12 MONTHS, YOU WORRIED THAT YOUR FOOD WOULD RUN OUT BEFORE YOU GOT MONEY TO BUY MORE.: NEVER TRUE

## 2024-06-24 SDOH — ECONOMIC STABILITY: FOOD INSECURITY: WITHIN THE PAST 12 MONTHS, THE FOOD YOU BOUGHT JUST DIDN'T LAST AND YOU DIDN'T HAVE MONEY TO GET MORE.: NEVER TRUE

## 2024-06-24 SDOH — ECONOMIC STABILITY: INCOME INSECURITY: HOW HARD IS IT FOR YOU TO PAY FOR THE VERY BASICS LIKE FOOD, HOUSING, MEDICAL CARE, AND HEATING?: NOT HARD AT ALL

## 2024-06-24 NOTE — PROGRESS NOTES
Mireille Carr (: 1965) is a 59 y.o. female, Established patient, here for evaluation of the following chief complaint(s):  Weight Management (PT has vomited, and has been gassy.  She is learning on things to do and not to do.  She thinks she was taking to much miralax.  )        ASSESSMENT/PLAN:  1. Class 2 obesity due to excess calories without serious comorbidity with body mass index (BMI) of 37.0 to 37.9 in adult  - doing well, continue same dose  - continue healthy diet with protein and water.  Continue exercise   - Semaglutide,0.25 or 0.5MG/DOS, 2 MG/1.5ML SOPN; Inject 0.6 mg into the skin once a week COMPOUND MEDICATION  Dispense: 4 Adjustable Dose Pre-filled Pen Syringe; Refill: 0  - f/u in 2 months, sooner if needed       No follow-ups on file.    SUBJECTIVE/OBJECTIVE:  Weight Management          Patient is here for follow up weight loss  Start medication- Semaglutide compound   Date of start - 2024  Diet changes - more protein, more water   Exercise-  cardio drumming, pilates, walking    Amount of weight loss - 20 lb   Side effects- constipation- taking senacot     Wt Readings from Last 3 Encounters:   24 82.1 kg (181 lb)   24 88 kg (194 lb)   24 91.2 kg (201 lb)        Review of Systems   All other systems reviewed and are negative.      Physical Exam  Vitals reviewed.   Constitutional:       Appearance: Normal appearance.   HENT:      Head: Normocephalic.   Cardiovascular:      Rate and Rhythm: Normal rate.      Heart sounds: Normal heart sounds.   Pulmonary:      Effort: Pulmonary effort is normal.      Breath sounds: Normal breath sounds.   Neurological:      Mental Status: She is alert.         Vitals:    24 1558   BP: 108/72   Site: Right Upper Arm   Position: Sitting   Cuff Size: Medium Adult   Pulse: 71   Temp: 97.3 °F (36.3 °C)   SpO2: 95%   Weight: 82.1 kg (181 lb)   Height: 1.549 m (5' 1\")                 An electronic signature was used to authenticate

## 2024-06-27 DIAGNOSIS — F43.23 ADJUSTMENT DISORDER WITH MIXED ANXIETY AND DEPRESSED MOOD: ICD-10-CM

## 2024-06-27 NOTE — TELEPHONE ENCOUNTER
Comments:     Last Office Visit (last PCP visit):   6/24/2024    Next Visit Date:  Future Appointments   Date Time Provider Department Center   7/8/2024  2:45 PM Lilly, Dean K, MD [place] [facility]   8/26/2024  3:45 PM Autumn Green PA [facility]       **If hasn't been seen in over a year OR hasn't followed up according to last diabetes/ADHD visit, make appointment for patient before sending refill to provider.    Rx requested:  Requested Prescriptions     Pending Prescriptions Disp Refills    traZODone (DESYREL) 50 MG tablet 90 tablet 2     Sig: Take 1 tablet by mouth nightly

## 2024-06-28 RX ORDER — TRAZODONE HYDROCHLORIDE 50 MG/1
50 TABLET ORAL NIGHTLY
Qty: 90 TABLET | Refills: 2 | Status: SHIPPED | OUTPATIENT
Start: 2024-06-28

## 2024-07-08 ENCOUNTER — PREP FOR PROCEDURE (OUTPATIENT)
Dept: GASTROENTEROLOGY | Age: 59
End: 2024-07-08

## 2024-07-08 ENCOUNTER — OFFICE VISIT (OUTPATIENT)
Dept: GASTROENTEROLOGY | Age: 59
End: 2024-07-08
Payer: COMMERCIAL

## 2024-07-08 VITALS — HEART RATE: 60 BPM | WEIGHT: 179 LBS | OXYGEN SATURATION: 97 % | HEIGHT: 61 IN | BODY MASS INDEX: 33.79 KG/M2

## 2024-07-08 DIAGNOSIS — R14.2 BELCHING SYMPTOM: Primary | ICD-10-CM

## 2024-07-08 DIAGNOSIS — R19.4 CHANGE IN BOWEL HABITS: ICD-10-CM

## 2024-07-08 DIAGNOSIS — K21.9 GASTROESOPHAGEAL REFLUX DISEASE, UNSPECIFIED WHETHER ESOPHAGITIS PRESENT: ICD-10-CM

## 2024-07-08 DIAGNOSIS — R14.2 BELCHING SYMPTOM: ICD-10-CM

## 2024-07-08 DIAGNOSIS — R14.0 ABDOMINAL BLOATING: ICD-10-CM

## 2024-07-08 DIAGNOSIS — Z80.0 FAMILY HISTORY OF MALIGNANT NEOPLASM OF ESOPHAGUS: ICD-10-CM

## 2024-07-08 DIAGNOSIS — K21.9 GASTROESOPHAGEAL REFLUX DISEASE WITHOUT ESOPHAGITIS: ICD-10-CM

## 2024-07-08 PROCEDURE — G8427 DOCREV CUR MEDS BY ELIG CLIN: HCPCS | Performed by: INTERNAL MEDICINE

## 2024-07-08 PROCEDURE — 1036F TOBACCO NON-USER: CPT | Performed by: INTERNAL MEDICINE

## 2024-07-08 PROCEDURE — 3017F COLORECTAL CA SCREEN DOC REV: CPT | Performed by: INTERNAL MEDICINE

## 2024-07-08 PROCEDURE — G8417 CALC BMI ABV UP PARAM F/U: HCPCS | Performed by: INTERNAL MEDICINE

## 2024-07-08 PROCEDURE — 99203 OFFICE O/P NEW LOW 30 MIN: CPT | Performed by: INTERNAL MEDICINE

## 2024-07-08 RX ORDER — SODIUM CHLORIDE 9 MG/ML
INJECTION, SOLUTION INTRAVENOUS CONTINUOUS
Status: CANCELLED | OUTPATIENT
Start: 2024-07-08

## 2024-07-08 RX ORDER — SODIUM CHLORIDE 9 MG/ML
INJECTION, SOLUTION INTRAVENOUS PRN
Status: CANCELLED | OUTPATIENT
Start: 2024-07-08

## 2024-07-08 RX ORDER — SODIUM CHLORIDE 0.9 % (FLUSH) 0.9 %
5-40 SYRINGE (ML) INJECTION EVERY 12 HOURS SCHEDULED
Status: CANCELLED | OUTPATIENT
Start: 2024-07-08

## 2024-07-08 RX ORDER — OMEPRAZOLE 20 MG/1
20 CAPSULE, DELAYED RELEASE ORAL
Qty: 30 CAPSULE | Refills: 5 | Status: SHIPPED | OUTPATIENT
Start: 2024-07-08

## 2024-07-08 RX ORDER — SODIUM CHLORIDE 0.9 % (FLUSH) 0.9 %
5-40 SYRINGE (ML) INJECTION PRN
Status: CANCELLED | OUTPATIENT
Start: 2024-07-08

## 2024-07-08 NOTE — PROGRESS NOTES
Gastroenterology Clinic Visit    Mireille Carr  [id number]  Chief Complaint   Patient presents with    Follow-up     HPI: 59 y.o. female presents to the clinic with constellation of upper and lower GI symptoms including belching, bloating and reflux symptoms from upper GI standpoint and change in bowel habits from a lower GI standpoint.  Patient reports symptoms likely starting after starting Ozempic 4 months ago.  She takes Prilosec very intermittently with some improvement in symptoms.  She denies any dysphagia or hematemesis.  Since starting Ozempic patient has lost 27 LBS.  She additionally reports change in bowel habits, has loose bowel movements on infrequent basis.  Denies any blood in the stool.  Her diet is changed, consuming more smoothies, does consume a lot more fruits and vegetables over the last 3 to 4 months.  Has made significant changes in getting out foods with added sugar, sweet drinks, sweets.    Previous GI work up/Endoscopic investigations:   Colonoscopy: 3/18/2019: Pandiverticulosis, extensive, carpet like flat polyp ascending colon  Histology: Hyperplastic    Review of Systems   All other systems reviewed and are negative.     Past Medical History:   Diagnosis Date    Anxiety     Asthma Child    GERD (gastroesophageal reflux disease) 38 yrs old    Polyps    Obesity Adult life    Would like to discuss options of possible medication or over the counter suggestions    Osteoarthritis 2016    Need bone density    Osteopenia 2013    Urinary incontinence Last few years     Past Surgical History:   Procedure Laterality Date     SECTION      C section on 3 live births    HYSTERECTOMY (CERVIX STATUS UNKNOWN)      HYSTERECTOMY, TOTAL ABDOMINAL (CERVIX REMOVED)      Endometriosis     Current Outpatient Medications on File Prior to Visit   Medication Sig Dispense Refill    traZODone (DESYREL) 50 MG tablet Take 1 tablet by mouth nightly 90 tablet 2    Semaglutide,0.25 or 0.5MG/DOS, 2

## 2024-07-20 ENCOUNTER — ANESTHESIA EVENT (OUTPATIENT)
Dept: ENDOSCOPY | Age: 59
End: 2024-07-20
Payer: COMMERCIAL

## 2024-07-20 NOTE — ANESTHESIA PRE PROCEDURE
Department of Anesthesiology  Preprocedure Note       Name:  Mireille Carr   Age:  59 y.o.  :  1965                                          MRN:  [id number]         Date:  2024      Surgeon: Surgeon(s):  Dean Lilly MD    Procedure: Procedure(s):  ESOPHAGOGASTRODUODENOSCOPY  COLONOSCOPY DIAGNOSTIC    Medications prior to admission:   Prior to Admission medications    Medication Sig Start Date End Date Taking? Authorizing Provider   omeprazole (PRILOSEC) 20 MG delayed release capsule Take 1 capsule by mouth every morning (before breakfast) 24   Dean Lilly MD   traZODone (DESYREL) 50 MG tablet Take 1 tablet by mouth nightly 24   Autumn Green PA   Semaglutide,0.25 or 0.5MG/DOS, 2 MG/1.5ML SOPN Inject 0.6 mg into the skin once a week COMPOUND MEDICATION 24   Autumn Green PA   UNABLE TO FIND Vitafusion  Patient not taking: Reported on 2024    Provider, MD Vinny   montelukast (SINGULAIR) 10 MG tablet Take 1 tablet by mouth nightly 23   Autumn Green PA   busPIRone (BUSPAR) 15 MG tablet take 1 tablet by mouth three times a day  Patient taking differently: 15 mg once take 1 tablet by mouth three times a day 23   Autumn Green PA   PARoxetine (PAXIL) 40 MG tablet take 1 tablet by mouth every morning 11/10/23   Autumn Green PA   fluticasone (FLONASE) 50 MCG/ACT nasal spray 1 spray by Nasal route daily  Patient not taking: Reported on 2023   Martina Blackman APRN       Current medications:    No current facility-administered medications for this encounter.     Current Outpatient Medications   Medication Sig Dispense Refill    omeprazole (PRILOSEC) 20 MG delayed release capsule Take 1 capsule by mouth every morning (before breakfast) 30 capsule 5    traZODone (DESYREL) 50 MG tablet Take 1 tablet by mouth nightly 90 tablet 2    Semaglutide,0.25 or 0.5MG/DOS, 2 MG/1.5ML SOPN Inject 0.6 mg into the skin once a week COMPOUND

## 2024-07-22 ENCOUNTER — HOSPITAL ENCOUNTER (OUTPATIENT)
Age: 59
Setting detail: OUTPATIENT SURGERY
Discharge: HOME | End: 2024-07-22
Attending: INTERNAL MEDICINE | Admitting: INTERNAL MEDICINE
Payer: COMMERCIAL

## 2024-07-22 ENCOUNTER — ANESTHESIA (OUTPATIENT)
Dept: ENDOSCOPY | Age: 59
End: 2024-07-22
Payer: COMMERCIAL

## 2024-07-22 VITALS
HEART RATE: 87 BPM | DIASTOLIC BLOOD PRESSURE: 74 MMHG | HEIGHT: 61 IN | SYSTOLIC BLOOD PRESSURE: 115 MMHG | RESPIRATION RATE: 18 BRPM | OXYGEN SATURATION: 95 % | TEMPERATURE: 97.3 F | BODY MASS INDEX: 33.79 KG/M2 | WEIGHT: 179 LBS

## 2024-07-22 DIAGNOSIS — R19.4 CHANGE IN BOWEL HABITS: ICD-10-CM

## 2024-07-22 DIAGNOSIS — K21.9 GASTROESOPHAGEAL REFLUX DISEASE WITHOUT ESOPHAGITIS: ICD-10-CM

## 2024-07-22 DIAGNOSIS — R14.2 BELCHING SYMPTOM: ICD-10-CM

## 2024-07-22 DIAGNOSIS — Z80.0 FAMILY HISTORY OF MALIGNANT NEOPLASM OF ESOPHAGUS: ICD-10-CM

## 2024-07-22 DIAGNOSIS — R14.0 ABDOMINAL BLOATING: ICD-10-CM

## 2024-07-22 PROCEDURE — 88305 TISSUE EXAM BY PATHOLOGIST: CPT

## 2024-07-22 PROCEDURE — 2709999900 HC NON-CHARGEABLE SUPPLY: Performed by: INTERNAL MEDICINE

## 2024-07-22 PROCEDURE — 3700000000 HC ANESTHESIA ATTENDED CARE: Performed by: INTERNAL MEDICINE

## 2024-07-22 PROCEDURE — 6360000002 HC RX W HCPCS: Performed by: REGISTERED NURSE

## 2024-07-22 PROCEDURE — 2580000003 HC RX 258: Performed by: INTERNAL MEDICINE

## 2024-07-22 PROCEDURE — 7100000011 HC PHASE II RECOVERY - ADDTL 15 MIN: Performed by: INTERNAL MEDICINE

## 2024-07-22 PROCEDURE — 88342 IMHCHEM/IMCYTCHM 1ST ANTB: CPT

## 2024-07-22 PROCEDURE — 3700000001 HC ADD 15 MINUTES (ANESTHESIA): Performed by: INTERNAL MEDICINE

## 2024-07-22 PROCEDURE — 7100000010 HC PHASE II RECOVERY - FIRST 15 MIN: Performed by: INTERNAL MEDICINE

## 2024-07-22 PROCEDURE — 3609017100 HC EGD: Performed by: INTERNAL MEDICINE

## 2024-07-22 PROCEDURE — 2500000003 HC RX 250 WO HCPCS: Performed by: REGISTERED NURSE

## 2024-07-22 PROCEDURE — 3609027000 HC COLONOSCOPY: Performed by: INTERNAL MEDICINE

## 2024-07-22 RX ORDER — SODIUM CHLORIDE 9 MG/ML
INJECTION, SOLUTION INTRAVENOUS CONTINUOUS
Status: DISCONTINUED | OUTPATIENT
Start: 2024-07-22 | End: 2024-07-22 | Stop reason: HOSPADM

## 2024-07-22 RX ORDER — GLYCOPYRROLATE 1 MG/5 ML
SYRINGE (ML) INTRAVENOUS PRN
Status: DISCONTINUED | OUTPATIENT
Start: 2024-07-22 | End: 2024-07-22 | Stop reason: SDUPTHER

## 2024-07-22 RX ORDER — LIDOCAINE HYDROCHLORIDE 20 MG/ML
INJECTION, SOLUTION INFILTRATION; PERINEURAL PRN
Status: DISCONTINUED | OUTPATIENT
Start: 2024-07-22 | End: 2024-07-22 | Stop reason: SDUPTHER

## 2024-07-22 RX ORDER — SODIUM CHLORIDE 0.9 % (FLUSH) 0.9 %
5-40 SYRINGE (ML) INJECTION EVERY 12 HOURS SCHEDULED
Status: DISCONTINUED | OUTPATIENT
Start: 2024-07-22 | End: 2024-07-22 | Stop reason: HOSPADM

## 2024-07-22 RX ORDER — PROPOFOL 10 MG/ML
INJECTION, EMULSION INTRAVENOUS PRN
Status: DISCONTINUED | OUTPATIENT
Start: 2024-07-22 | End: 2024-07-22 | Stop reason: SDUPTHER

## 2024-07-22 RX ORDER — SODIUM CHLORIDE 9 MG/ML
INJECTION, SOLUTION INTRAVENOUS PRN
Status: DISCONTINUED | OUTPATIENT
Start: 2024-07-22 | End: 2024-07-22 | Stop reason: HOSPADM

## 2024-07-22 RX ORDER — SODIUM CHLORIDE 0.9 % (FLUSH) 0.9 %
5-40 SYRINGE (ML) INJECTION PRN
Status: DISCONTINUED | OUTPATIENT
Start: 2024-07-22 | End: 2024-07-22 | Stop reason: HOSPADM

## 2024-07-22 RX ADMIN — LIDOCAINE HYDROCHLORIDE 60 MG: 20 INJECTION, SOLUTION INFILTRATION; PERINEURAL at 10:34

## 2024-07-22 RX ADMIN — Medication 0.2 MG: at 10:31

## 2024-07-22 RX ADMIN — PROPOFOL 50 MG: 10 INJECTION, EMULSION INTRAVENOUS at 10:34

## 2024-07-22 RX ADMIN — PROPOFOL 150 MG: 10 INJECTION, EMULSION INTRAVENOUS at 10:33

## 2024-07-22 RX ADMIN — PROPOFOL 100 MG: 10 INJECTION, EMULSION INTRAVENOUS at 10:36

## 2024-07-22 RX ADMIN — PROPOFOL 50 MG: 10 INJECTION, EMULSION INTRAVENOUS at 10:43

## 2024-07-22 RX ADMIN — PROPOFOL 50 MG: 10 INJECTION, EMULSION INTRAVENOUS at 10:41

## 2024-07-22 RX ADMIN — PROPOFOL 50 MG: 10 INJECTION, EMULSION INTRAVENOUS at 10:38

## 2024-07-22 RX ADMIN — SODIUM CHLORIDE: 9 INJECTION, SOLUTION INTRAVENOUS at 09:42

## 2024-07-22 RX ADMIN — PROPOFOL 50 MG: 10 INJECTION, EMULSION INTRAVENOUS at 10:39

## 2024-07-22 NOTE — ANESTHESIA POSTPROCEDURE EVALUATION
Department of Anesthesiology  Postprocedure Note    Patient: Mireille Carr  MRN: [id number]  YOB: 1965  Date of evaluation: 7/22/2024    Procedure Summary       Date: 07/22/24 Room / Location: [facility] OR 02 / [facility]    Anesthesia Start: 1020 Anesthesia Stop: 1105    Procedures:       ESOPHAGOGASTRODUODENOSCOPY with biopsy      COLONOSCOPY DIAGNOSTIC with biopsy Diagnosis:       Change in bowel habits      Belching symptom      Abdominal bloating      Family history of malignant neoplasm of esophagus      Gastroesophageal reflux disease without esophagitis      (Change in bowel habits [R19.4])      (Belching symptom [R14.2])      (Abdominal bloating [R14.0])      (Family history of malignant neoplasm of esophagus [Z80.0])      (Gastroesophageal reflux disease without esophagitis [K21.9])    Surgeons: Dean Lilly MD Responsible Provider: Geoffrey Brink APRN - CRNA    Anesthesia Type: MAC ASA Status: 2            Anesthesia Type: No value filed.    Anahi Phase I: Anahi Score: 10    Anahi Phase II: Anahi Score: 10    Anesthesia Post Evaluation    Patient location during evaluation: bedside  Patient participation: complete - patient participated  Level of consciousness: awake and awake and alert  Airway patency: patent  Nausea & Vomiting: no nausea and no vomiting  Cardiovascular status: blood pressure returned to baseline and hemodynamically stable  Respiratory status: acceptable  Hydration status: euvolemic  Pain management: adequate        No notable events documented.

## 2024-07-22 NOTE — H&P
Patient Name: Mireille Carr  : 1965  MRN: [id number]  DATE: 24      ENDOSCOPY  History and Physical    Procedure:    [x] Diagnostic Colonoscopy       [] Screening Colonoscopy  [x] EGD      [] ERCP      [] EUS       [] Other    [x] Previous office notes/History and Physical reviewed from the patients chart. Please see EMR for further details of HPI. I have examined the patient's status immediately prior to the procedure and:      Indications/HPI:    []Abdominal Pain   []Cancer- GI/Lung  []Fhx of colon CA  []History of Polyps   []Rick’s   []Melena  []Abnormal Imaging   []Dysphagia    []Persistent Pneumonia  []Anemia   []Food Impaction  []History of Polyps  []GI Bleed   []Pulmonary nodule/Mass  []Change in bowel habits  [x]Heartburn/Reflux  []Rectal Bleed (BRBPR)  []Chest Pain - Non Cardiac  []Heme (+) Stool  []Ulcers  []Constipation   []Hemoptysis   []Varices  []Diarrhea   []Hypoxemia  []Nausea/Vomiting   []Screening   []Crohns/Colitis  [x]Other: Change in bowel habits    Anesthesia:   [x] MAC [] Moderate Sedation   [] General   [] None     ROS: 12 pt Review of Symptoms was negative unless mentioned above    Medications:   Prior to Admission medications    Medication Sig Start Date End Date Taking? Authorizing Provider   omeprazole (PRILOSEC) 20 MG delayed release capsule Take 1 capsule by mouth every morning (before breakfast) 24   Dean Lilly MD   traZODone (DESYREL) 50 MG tablet Take 1 tablet by mouth nightly 24   Autumn Green PA   Semaglutide,0.25 or 0.5MG/DOS, 2 MG/1.5ML SOPN Inject 0.6 mg into the skin once a week COMPOUND MEDICATION 24   Autumn Green PA   UNABLE TO FIND Vitafusion  Patient not taking: Reported on 2024    ProviderVinny MD   montelukast (SINGULAIR) 10 MG tablet Take 1 tablet by mouth nightly 23   Autumn Green PA   busPIRone (BUSPAR) 15 MG tablet take 1 tablet by mouth three times a day  Patient taking differently: 15 mg once

## 2024-08-06 DIAGNOSIS — E66.812 CLASS 2 OBESITY DUE TO EXCESS CALORIES WITHOUT SERIOUS COMORBIDITY WITH BODY MASS INDEX (BMI) OF 37.0 TO 37.9 IN ADULT: ICD-10-CM

## 2024-08-06 DIAGNOSIS — E66.09 CLASS 2 OBESITY DUE TO EXCESS CALORIES WITHOUT SERIOUS COMORBIDITY WITH BODY MASS INDEX (BMI) OF 37.0 TO 37.9 IN ADULT: ICD-10-CM

## 2024-08-06 NOTE — TELEPHONE ENCOUNTER
Comments:     Last Office Visit (last PCP visit):   6/24/2024    Next Visit Date:  Future Appointments   Date Time Provider Department Center   8/26/2024  3:45 PM Autumn Green PA [facility]       **If hasn't been seen in over a year OR hasn't followed up according to last diabetes/ADHD visit, make appointment for patient before sending refill to provider.    Rx requested:  Requested Prescriptions     Pending Prescriptions Disp Refills    Semaglutide,0.25 or 0.5MG/DOS, 2 MG/1.5ML SOPN 4 Adjustable Dose Pre-filled Pen Syringe 0     Sig: Inject 0.6 mg into the skin once a week COMPOUND MEDICATION

## 2024-08-26 ENCOUNTER — OFFICE VISIT (OUTPATIENT)
Dept: INTERNAL MEDICINE | Age: 59
End: 2024-08-26
Payer: COMMERCIAL

## 2024-08-26 VITALS
DIASTOLIC BLOOD PRESSURE: 70 MMHG | SYSTOLIC BLOOD PRESSURE: 110 MMHG | WEIGHT: 178 LBS | HEART RATE: 71 BPM | HEIGHT: 61 IN | BODY MASS INDEX: 33.61 KG/M2 | TEMPERATURE: 97.3 F | OXYGEN SATURATION: 99 %

## 2024-08-26 DIAGNOSIS — E66.09 CLASS 1 OBESITY DUE TO EXCESS CALORIES WITHOUT SERIOUS COMORBIDITY WITH BODY MASS INDEX (BMI) OF 33.0 TO 33.9 IN ADULT: Primary | ICD-10-CM

## 2024-08-26 PROBLEM — E66.811 CLASS 1 OBESITY DUE TO EXCESS CALORIES WITHOUT SERIOUS COMORBIDITY WITH BODY MASS INDEX (BMI) OF 33.0 TO 33.9 IN ADULT: Status: ACTIVE | Noted: 2022-10-10

## 2024-08-26 PROCEDURE — 3074F SYST BP LT 130 MM HG: CPT | Performed by: PHYSICIAN ASSISTANT

## 2024-08-26 PROCEDURE — 3017F COLORECTAL CA SCREEN DOC REV: CPT | Performed by: PHYSICIAN ASSISTANT

## 2024-08-26 PROCEDURE — G8417 CALC BMI ABV UP PARAM F/U: HCPCS | Performed by: PHYSICIAN ASSISTANT

## 2024-08-26 PROCEDURE — 99213 OFFICE O/P EST LOW 20 MIN: CPT | Performed by: PHYSICIAN ASSISTANT

## 2024-08-26 PROCEDURE — G8427 DOCREV CUR MEDS BY ELIG CLIN: HCPCS | Performed by: PHYSICIAN ASSISTANT

## 2024-08-26 PROCEDURE — 1036F TOBACCO NON-USER: CPT | Performed by: PHYSICIAN ASSISTANT

## 2024-08-26 PROCEDURE — 3078F DIAST BP <80 MM HG: CPT | Performed by: PHYSICIAN ASSISTANT

## 2024-08-26 NOTE — PROGRESS NOTES
Mireille Carr (: 1965) is a 59 y.o. female, Established patient, here for evaluation of the following chief complaint(s):  Weight Management (No complaints)        ASSESSMENT/PLAN:  1. Class 1 obesity due to excess calories without serious comorbidity with body mass index (BMI) of 33.0 to 33.9 in adult  - doing well, increased the dose   - continue healthy diet with protein and water.  Continue exercise   - Semaglutide, Inject 1.2 mg into the skin once a week COMPOUND MEDICATION  Dispense: 4 Adjustable Dose Pre-filled Pen Syringe; Refill: 0  - f/u in 2 months, sooner if needed       No follow-ups on file.    SUBJECTIVE/OBJECTIVE:  HPI      Patient is here for follow up weight loss  Start medication- Semaglutide compound   Date of start - 2024  Diet changes - more protein, more water   Exercise-  cardio drumming, pilates, walking    Amount of weight loss - 21 lb       Wt Readings from Last 3 Encounters:   24 80.7 kg (178 lb)   24 81.2 kg (179 lb)   24 81.2 kg (179 lb)        Review of Systems   All other systems reviewed and are negative.      Physical Exam  Vitals reviewed.   Constitutional:       Appearance: Normal appearance.   HENT:      Head: Normocephalic.   Cardiovascular:      Rate and Rhythm: Normal rate.      Heart sounds: Normal heart sounds.   Pulmonary:      Effort: Pulmonary effort is normal.      Breath sounds: Normal breath sounds.   Neurological:      Mental Status: She is alert.         Vitals:    24 1550   BP: 110/70   Site: Right Upper Arm   Position: Sitting   Cuff Size: Medium Adult   Pulse: 71   Temp: 97.3 °F (36.3 °C)   SpO2: 99%   Weight: 80.7 kg (178 lb)   Height: 1.549 m (5' 1\")                 An electronic signature was used to authenticate this note.    --JEANNA Alejandro

## 2024-10-14 ENCOUNTER — OFFICE VISIT (OUTPATIENT)
Dept: INTERNAL MEDICINE | Age: 59
End: 2024-10-14
Payer: COMMERCIAL

## 2024-10-14 VITALS
BODY MASS INDEX: 32.69 KG/M2 | SYSTOLIC BLOOD PRESSURE: 116 MMHG | OXYGEN SATURATION: 97 % | WEIGHT: 173 LBS | HEART RATE: 71 BPM | TEMPERATURE: 97.2 F | DIASTOLIC BLOOD PRESSURE: 70 MMHG

## 2024-10-14 DIAGNOSIS — L30.9 DERMATITIS: Primary | ICD-10-CM

## 2024-10-14 PROCEDURE — 3078F DIAST BP <80 MM HG: CPT | Performed by: STUDENT IN AN ORGANIZED HEALTH CARE EDUCATION/TRAINING PROGRAM

## 2024-10-14 PROCEDURE — G8484 FLU IMMUNIZE NO ADMIN: HCPCS | Performed by: STUDENT IN AN ORGANIZED HEALTH CARE EDUCATION/TRAINING PROGRAM

## 2024-10-14 PROCEDURE — G8417 CALC BMI ABV UP PARAM F/U: HCPCS | Performed by: STUDENT IN AN ORGANIZED HEALTH CARE EDUCATION/TRAINING PROGRAM

## 2024-10-14 PROCEDURE — 1036F TOBACCO NON-USER: CPT | Performed by: STUDENT IN AN ORGANIZED HEALTH CARE EDUCATION/TRAINING PROGRAM

## 2024-10-14 PROCEDURE — 3074F SYST BP LT 130 MM HG: CPT | Performed by: STUDENT IN AN ORGANIZED HEALTH CARE EDUCATION/TRAINING PROGRAM

## 2024-10-14 PROCEDURE — G8427 DOCREV CUR MEDS BY ELIG CLIN: HCPCS | Performed by: STUDENT IN AN ORGANIZED HEALTH CARE EDUCATION/TRAINING PROGRAM

## 2024-10-14 PROCEDURE — 99213 OFFICE O/P EST LOW 20 MIN: CPT | Performed by: STUDENT IN AN ORGANIZED HEALTH CARE EDUCATION/TRAINING PROGRAM

## 2024-10-14 PROCEDURE — 3017F COLORECTAL CA SCREEN DOC REV: CPT | Performed by: STUDENT IN AN ORGANIZED HEALTH CARE EDUCATION/TRAINING PROGRAM

## 2024-10-14 RX ORDER — PREDNISONE 10 MG/1
TABLET ORAL
Qty: 28 TABLET | Refills: 0 | Status: SHIPPED | OUTPATIENT
Start: 2024-10-14

## 2024-10-14 NOTE — PROGRESS NOTES
[facility]  [facility]   [address] [place] [id number]   P: [phone]      Mireille Carr (:  1965) is a 59 y.o. female,Established patient, here for evaluation of the following chief complaint(s):  Rash (X 2 weeks. All over her body. Itching and burning)      Assessment & Plan   ASSESSMENT/PLAN:  1. Dermatitis  Assessment & Plan:   Worse after Ozempic injection    Did not appear vesicular or to be c/w shingles   Cont topical steroid cream    Rx for PO Prednisone taper over 10 days    Discussed holding Ozempic    Re-establish with Dermatology if needed    RTC if sx persist or worsen     Orders:  -     predniSONE (DELTASONE) 10 MG tablet; Take 4 tabs x 3 days, then 3 tabs x 3 days, then 2 tabs x3 days, then 1 tab x 1 day then stop, Disp-28 tablet, R-0Normal      No results found for any visits on 10/14/24.     No follow-ups on file.         Subjective   SUBJECTIVE/OBJECTIVE:  HPI    Ms. Mireille Carr is a 60 yo female with pmh of htn, gallstones, GERD, BESSY,  osteopenia who presents with CC of rash x 2 weeks. All over her body. It itches and burns.     Pt noted she developed itching on her back. She felt it itching before the rash broke out. She has a rash primarily on her bilateral lower back as well as on her chest. She started a steroid cream that she was previously prescribed by a dermatologist. She has tried calamine lotion.     Her dose of of Ozempic was increased on . She notes after she gets a dose of Ozempic the rash got slightly worse.     Pt takes Semaglutide for weight loss. No throat symptoms/itching or swelling of the throat.     Review of Systems   Constitutional:         See HPI for ROS            Objective   Physical Exam  Constitutional:       Appearance: Normal appearance.   HENT:      Head: Normocephalic and atraumatic.   Eyes:      Extraocular Movements: Extraocular movements intact.   Cardiovascular:      Rate and Rhythm:

## 2024-10-14 NOTE — ASSESSMENT & PLAN NOTE
Worse after Ozempic injection    Did not appear vesicular or to be c/w shingles   Cont topical steroid cream    Rx for PO Prednisone taper over 10 days    Discussed holding Ozempic    Re-establish with Dermatology if needed    RTC if sx persist or worsen

## 2024-12-09 DIAGNOSIS — F43.23 ADJUSTMENT DISORDER WITH MIXED ANXIETY AND DEPRESSED MOOD: ICD-10-CM

## 2024-12-10 NOTE — TELEPHONE ENCOUNTER
Comments:     Last Office Visit (last PCP visit):   Visit date not found    Next Visit Date:  No future appointments.    **If hasn't been seen in over a year OR hasn't followed up according to last diabetes/ADHD visit, make appointment for patient before sending refill to provider.    Rx requested:  Requested Prescriptions     Pending Prescriptions Disp Refills    traZODone (DESYREL) 50 MG tablet 90 tablet 2     Sig: Take 1 tablet by mouth nightly

## 2024-12-11 RX ORDER — TRAZODONE HYDROCHLORIDE 50 MG/1
50 TABLET, FILM COATED ORAL NIGHTLY
Qty: 90 TABLET | Refills: 0 | Status: SHIPPED | OUTPATIENT
Start: 2024-12-11

## 2025-01-08 ENCOUNTER — PATIENT MESSAGE (OUTPATIENT)
Dept: INTERNAL MEDICINE | Age: 60
End: 2025-01-08

## 2025-01-08 DIAGNOSIS — Z12.31 ENCOUNTER FOR SCREENING MAMMOGRAM FOR MALIGNANT NEOPLASM OF BREAST: Primary | ICD-10-CM

## 2025-01-17 ASSESSMENT — PATIENT HEALTH QUESTIONNAIRE - PHQ9
SUM OF ALL RESPONSES TO PHQ QUESTIONS 1-9: 0
2. FEELING DOWN, DEPRESSED OR HOPELESS: NOT AT ALL
1. LITTLE INTEREST OR PLEASURE IN DOING THINGS: NOT AT ALL
SUM OF ALL RESPONSES TO PHQ QUESTIONS 1-9: 0
SUM OF ALL RESPONSES TO PHQ9 QUESTIONS 1 & 2: 0
2. FEELING DOWN, DEPRESSED OR HOPELESS: NOT AT ALL
SUM OF ALL RESPONSES TO PHQ QUESTIONS 1-9: 0
1. LITTLE INTEREST OR PLEASURE IN DOING THINGS: NOT AT ALL
SUM OF ALL RESPONSES TO PHQ9 QUESTIONS 1 & 2: 0
SUM OF ALL RESPONSES TO PHQ QUESTIONS 1-9: 0

## 2025-01-20 ENCOUNTER — OFFICE VISIT (OUTPATIENT)
Dept: INTERNAL MEDICINE | Age: 60
End: 2025-01-20
Payer: COMMERCIAL

## 2025-01-20 VITALS
OXYGEN SATURATION: 98 % | BODY MASS INDEX: 35.11 KG/M2 | HEIGHT: 62 IN | WEIGHT: 190.8 LBS | SYSTOLIC BLOOD PRESSURE: 110 MMHG | HEART RATE: 61 BPM | TEMPERATURE: 97 F | DIASTOLIC BLOOD PRESSURE: 72 MMHG

## 2025-01-20 DIAGNOSIS — F41.1 GAD (GENERALIZED ANXIETY DISORDER): ICD-10-CM

## 2025-01-20 DIAGNOSIS — E87.5 HYPERKALEMIA: ICD-10-CM

## 2025-01-20 DIAGNOSIS — R73.03 PRE-DIABETES: ICD-10-CM

## 2025-01-20 DIAGNOSIS — J45.31 MILD PERSISTENT ASTHMA WITH EXACERBATION: ICD-10-CM

## 2025-01-20 DIAGNOSIS — D12.2 ADENOMATOUS POLYP OF ASCENDING COLON: ICD-10-CM

## 2025-01-20 DIAGNOSIS — E66.811 CLASS 1 OBESITY DUE TO EXCESS CALORIES WITH SERIOUS COMORBIDITY AND BODY MASS INDEX (BMI) OF 34.0 TO 34.9 IN ADULT: Primary | ICD-10-CM

## 2025-01-20 DIAGNOSIS — E66.09 CLASS 1 OBESITY DUE TO EXCESS CALORIES WITH SERIOUS COMORBIDITY AND BODY MASS INDEX (BMI) OF 34.0 TO 34.9 IN ADULT: ICD-10-CM

## 2025-01-20 DIAGNOSIS — F43.23 ADJUSTMENT DISORDER WITH MIXED ANXIETY AND DEPRESSED MOOD: ICD-10-CM

## 2025-01-20 DIAGNOSIS — E66.811 CLASS 1 OBESITY DUE TO EXCESS CALORIES WITH SERIOUS COMORBIDITY AND BODY MASS INDEX (BMI) OF 34.0 TO 34.9 IN ADULT: ICD-10-CM

## 2025-01-20 DIAGNOSIS — E66.09 CLASS 1 OBESITY DUE TO EXCESS CALORIES WITH SERIOUS COMORBIDITY AND BODY MASS INDEX (BMI) OF 34.0 TO 34.9 IN ADULT: Primary | ICD-10-CM

## 2025-01-20 PROBLEM — Z90.710 H/O TOTAL HYSTERECTOMY: Status: RESOLVED | Noted: 2022-10-10 | Resolved: 2025-01-20

## 2025-01-20 PROBLEM — R14.2 BELCHING SYMPTOM: Status: RESOLVED | Noted: 2024-07-08 | Resolved: 2025-01-20

## 2025-01-20 PROBLEM — R14.0 ABDOMINAL BLOATING: Status: RESOLVED | Noted: 2024-07-08 | Resolved: 2025-01-20

## 2025-01-20 PROBLEM — L30.9 DERMATITIS: Status: RESOLVED | Noted: 2024-10-14 | Resolved: 2025-01-20

## 2025-01-20 LAB
ALBUMIN SERPL-MCNC: 4 G/DL (ref 3.5–4.6)
ALP SERPL-CCNC: 65 U/L (ref 40–130)
ALT SERPL-CCNC: 16 U/L (ref 0–33)
ANION GAP SERPL CALCULATED.3IONS-SCNC: 10 MEQ/L (ref 9–15)
AST SERPL-CCNC: 20 U/L (ref 0–35)
BILIRUB SERPL-MCNC: 0.5 MG/DL (ref 0.2–0.7)
BUN SERPL-MCNC: 15 MG/DL (ref 8–23)
CALCIUM SERPL-MCNC: 9 MG/DL (ref 8.5–9.9)
CHLORIDE SERPL-SCNC: 105 MEQ/L (ref 95–107)
CHOLEST SERPL-MCNC: 212 MG/DL (ref 0–199)
CO2 SERPL-SCNC: 25 MEQ/L (ref 20–31)
CREAT SERPL-MCNC: 0.85 MG/DL (ref 0.5–0.9)
ESTIMATED AVERAGE GLUCOSE: 108 MG/DL
GLOBULIN SER CALC-MCNC: 2.3 G/DL (ref 2.3–3.5)
GLUCOSE SERPL-MCNC: 88 MG/DL (ref 70–99)
HBA1C MFR BLD: 5.4 % (ref 4–6)
HDLC SERPL-MCNC: 64 MG/DL (ref 40–59)
LDLC SERPL CALC-MCNC: 133 MG/DL (ref 0–129)
POTASSIUM SERPL-SCNC: 4.8 MEQ/L (ref 3.4–4.9)
PROT SERPL-MCNC: 6.3 G/DL (ref 6.3–8)
SODIUM SERPL-SCNC: 140 MEQ/L (ref 135–144)
TRIGL SERPL-MCNC: 73 MG/DL (ref 0–150)

## 2025-01-20 PROCEDURE — 1036F TOBACCO NON-USER: CPT | Performed by: STUDENT IN AN ORGANIZED HEALTH CARE EDUCATION/TRAINING PROGRAM

## 2025-01-20 PROCEDURE — 3017F COLORECTAL CA SCREEN DOC REV: CPT | Performed by: STUDENT IN AN ORGANIZED HEALTH CARE EDUCATION/TRAINING PROGRAM

## 2025-01-20 PROCEDURE — G8417 CALC BMI ABV UP PARAM F/U: HCPCS | Performed by: STUDENT IN AN ORGANIZED HEALTH CARE EDUCATION/TRAINING PROGRAM

## 2025-01-20 PROCEDURE — G8427 DOCREV CUR MEDS BY ELIG CLIN: HCPCS | Performed by: STUDENT IN AN ORGANIZED HEALTH CARE EDUCATION/TRAINING PROGRAM

## 2025-01-20 PROCEDURE — 99214 OFFICE O/P EST MOD 30 MIN: CPT | Performed by: STUDENT IN AN ORGANIZED HEALTH CARE EDUCATION/TRAINING PROGRAM

## 2025-01-20 RX ORDER — TRAZODONE HYDROCHLORIDE 50 MG/1
50 TABLET, FILM COATED ORAL NIGHTLY
Qty: 90 TABLET | Refills: 1 | Status: SHIPPED | OUTPATIENT
Start: 2025-01-20

## 2025-01-20 RX ORDER — PAROXETINE 40 MG/1
TABLET, FILM COATED ORAL
Qty: 90 TABLET | Refills: 3 | Status: SHIPPED | OUTPATIENT
Start: 2025-01-20

## 2025-01-20 RX ORDER — MONTELUKAST SODIUM 10 MG/1
10 TABLET ORAL NIGHTLY
Qty: 90 TABLET | Refills: 1 | Status: SHIPPED | OUTPATIENT
Start: 2025-01-20

## 2025-01-20 RX ORDER — BUSPIRONE HYDROCHLORIDE 15 MG/1
TABLET ORAL
Qty: 90 TABLET | Refills: 3 | Status: SHIPPED | OUTPATIENT
Start: 2025-01-20

## 2025-01-20 SDOH — ECONOMIC STABILITY: FOOD INSECURITY: WITHIN THE PAST 12 MONTHS, YOU WORRIED THAT YOUR FOOD WOULD RUN OUT BEFORE YOU GOT MONEY TO BUY MORE.: NEVER TRUE

## 2025-01-20 SDOH — ECONOMIC STABILITY: FOOD INSECURITY: WITHIN THE PAST 12 MONTHS, THE FOOD YOU BOUGHT JUST DIDN'T LAST AND YOU DIDN'T HAVE MONEY TO GET MORE.: NEVER TRUE

## 2025-01-20 NOTE — ASSESSMENT & PLAN NOTE
Last Hgb A1C 5.7%  Repeat test  Encourage low sugar/low carb diet. Discourage highly processed foods

## 2025-01-20 NOTE — PROGRESS NOTES
[facility]  [facility]   [address] [place], [place] [id number]   P: [phone]      Mireille Carr (:  1965) is a 59 y.o. female,Established patient, here for evaluation of the following chief complaint(s):  New Patient (Previous Autumn Mitterling patient) and Annual Exam      Assessment & Plan   ASSESSMENT/PLAN:  1. Class 1 obesity due to excess calories with serious comorbidity and body mass index (BMI) of 34.0 to 34.9 in adult  Assessment & Plan:  Resume Semaglutide at low dose, up titrate as possible  Encourage diet, exercise    Orders:  -     Lipid Panel; Future  -     Semaglutide-Weight Management (WEGOVY) 0.25 MG/0.5ML SOAJ SC injection; Inject 0.25 mg into the skin every 7 days, Disp-6 mL, R-0Normal  2. Adjustment disorder with mixed anxiety and depressed mood  -     traZODone (DESYREL) 50 MG tablet; Take 1 tablet by mouth nightly, Disp-90 tablet, R-1Normal  -     PARoxetine (PAXIL) 40 MG tablet; take 1 tablet by mouth every morning, Disp-90 tablet, R-3Normal  -     busPIRone (BUSPAR) 15 MG tablet; take 1 tablet by mouth nightly, Disp-90 tablet, R-3Normal  3. Mild persistent asthma with exacerbation  Assessment & Plan:  Non-active   Orders:  -     montelukast (SINGULAIR) 10 MG tablet; Take 1 tablet by mouth nightly, Disp-90 tablet, R-1Normal  4. Hyperkalemia  Assessment & Plan:  Mild , repeat lab   Orders:  -     Comprehensive Metabolic Panel; Future  5. Pre-diabetes  Assessment & Plan:  Last Hgb A1C 5.7%  Repeat test  Encourage low sugar/low carb diet. Discourage highly processed foods     Orders:  -     Hemoglobin A1C; Future  -     Semaglutide-Weight Management (WEGOVY) 0.25 MG/0.5ML SOAJ SC injection; Inject 0.25 mg into the skin every 7 days, Disp-6 mL, R-0Normal  6. Adenomatous polyp of ascending colon  Assessment & Plan:  Due for colonoscopy in   7. BESSY (generalized anxiety disorder)  Assessment & Plan:  Mood stable on current regimen      No

## 2025-02-01 ENCOUNTER — HOSPITAL ENCOUNTER (OUTPATIENT)
Dept: WOMENS IMAGING | Age: 60
End: 2025-02-01
Payer: COMMERCIAL

## 2025-02-01 DIAGNOSIS — Z12.31 ENCOUNTER FOR SCREENING MAMMOGRAM FOR MALIGNANT NEOPLASM OF BREAST: ICD-10-CM

## 2025-02-01 PROCEDURE — 77063 BREAST TOMOSYNTHESIS BI: CPT

## 2025-02-24 ENCOUNTER — OFFICE VISIT (OUTPATIENT)
Dept: INTERNAL MEDICINE | Age: 60
End: 2025-02-24
Payer: COMMERCIAL

## 2025-02-24 VITALS
SYSTOLIC BLOOD PRESSURE: 130 MMHG | OXYGEN SATURATION: 99 % | HEIGHT: 62 IN | DIASTOLIC BLOOD PRESSURE: 88 MMHG | BODY MASS INDEX: 34.78 KG/M2 | HEART RATE: 63 BPM | WEIGHT: 189 LBS | TEMPERATURE: 97.2 F

## 2025-02-24 DIAGNOSIS — F43.23 ADJUSTMENT DISORDER WITH MIXED ANXIETY AND DEPRESSED MOOD: ICD-10-CM

## 2025-02-24 DIAGNOSIS — B96.89 BACTERIAL SINUSITIS: Primary | ICD-10-CM

## 2025-02-24 DIAGNOSIS — B34.9 VIRAL ILLNESS: ICD-10-CM

## 2025-02-24 DIAGNOSIS — J32.9 BACTERIAL SINUSITIS: Primary | ICD-10-CM

## 2025-02-24 LAB
INFLUENZA A ANTIBODY: NEGATIVE
INFLUENZA B ANTIBODY: NEGATIVE

## 2025-02-24 PROCEDURE — G8417 CALC BMI ABV UP PARAM F/U: HCPCS | Performed by: STUDENT IN AN ORGANIZED HEALTH CARE EDUCATION/TRAINING PROGRAM

## 2025-02-24 PROCEDURE — 3017F COLORECTAL CA SCREEN DOC REV: CPT | Performed by: STUDENT IN AN ORGANIZED HEALTH CARE EDUCATION/TRAINING PROGRAM

## 2025-02-24 PROCEDURE — 99214 OFFICE O/P EST MOD 30 MIN: CPT | Performed by: STUDENT IN AN ORGANIZED HEALTH CARE EDUCATION/TRAINING PROGRAM

## 2025-02-24 PROCEDURE — 87804 INFLUENZA ASSAY W/OPTIC: CPT | Performed by: STUDENT IN AN ORGANIZED HEALTH CARE EDUCATION/TRAINING PROGRAM

## 2025-02-24 PROCEDURE — G8427 DOCREV CUR MEDS BY ELIG CLIN: HCPCS | Performed by: STUDENT IN AN ORGANIZED HEALTH CARE EDUCATION/TRAINING PROGRAM

## 2025-02-24 PROCEDURE — 1036F TOBACCO NON-USER: CPT | Performed by: STUDENT IN AN ORGANIZED HEALTH CARE EDUCATION/TRAINING PROGRAM

## 2025-02-24 RX ORDER — AZITHROMYCIN 250 MG/1
TABLET, FILM COATED ORAL
Qty: 6 TABLET | Refills: 0 | Status: SHIPPED | OUTPATIENT
Start: 2025-02-24 | End: 2025-03-06

## 2025-02-24 RX ORDER — PAROXETINE 40 MG/1
TABLET, FILM COATED ORAL
Qty: 90 TABLET | Refills: 1 | Status: SHIPPED | OUTPATIENT
Start: 2025-02-24

## 2025-02-24 NOTE — PROGRESS NOTES
[facility]  [facility]   [address] [place] [address]   P: [phone]      Mireille Carr (:  1965) is a 59 y.o. female,Established patient, here for evaluation of the following chief complaint(s):  Sinusitis (Facial pain, ear fullness.  Ongoing 2 days. 2 family members have flu A. Having some body aches.  )      Assessment & Plan   ASSESSMENT/PLAN:  1. Bacterial sinusitis  Assessment & Plan:   Discussed symptomatic management   Pt to hold off on abx unless sx unimproved   She is caring for sick family members   Orders:  -     azithromycin (ZITHROMAX) 250 MG tablet; 500mg on day 1 followed by 250mg on days 2 - 5, Disp-6 tablet, R-0Normal  2. Viral illness  -     POCT Influenza A/B  3. Adjustment disorder with mixed anxiety and depressed mood  -     PARoxetine (PAXIL) 40 MG tablet; take 1 tablet by mouth every morning, Disp-90 tablet, R-1Normal      Results for orders placed or performed in visit on 25   POCT Influenza A/B   Result Value Ref Range    Influenza A Ab negative     Influenza B Ab negative         No follow-ups on file.         Subjective   SUBJECTIVE/OBJECTIVE:  HPI    25:   Ms. Carr presents today with CC of facial pain, ear fullness x 2 D. +Sick contacts with influenza.     She reports dizziness, tinnitus. She has facial pressure and tenderness over maxillary sinuses. No nasal discharge. Denies fevers. Sx present x 3D.     She is using Tylenol for her symptoms.       Previous OV:   Ms. Mireille Carr is a pleasant 58 yo female with pmh of htn, GB calculus s/p cholecystectomy, GERD, osteopenia, BESSY, obesity, asthma (weather related), OA who presents to [facility] care.     Last visit with me was in October for dermatitis.     She is doing well overall. Caring for her elderly father.     Last Weight Metrics:      2025     8:09 AM 2025     9:27 AM 10/14/2024     9:06 AM 2024     3:50 PM 2024     9:40 AM 2024

## 2025-02-25 PROBLEM — B34.9 VIRAL ILLNESS: Status: ACTIVE | Noted: 2025-02-25

## 2025-02-25 PROBLEM — B96.89 BACTERIAL SINUSITIS: Status: ACTIVE | Noted: 2025-02-25

## 2025-02-25 PROBLEM — J32.9 BACTERIAL SINUSITIS: Status: ACTIVE | Noted: 2025-02-25

## 2025-02-25 NOTE — ASSESSMENT & PLAN NOTE
Discussed symptomatic management   Pt to hold off on abx unless sx unimproved   She is caring for sick family members

## 2025-07-17 ENCOUNTER — PATIENT MESSAGE (OUTPATIENT)
Dept: INTERNAL MEDICINE | Age: 60
End: 2025-07-17

## 2025-07-17 ENCOUNTER — OFFICE VISIT (OUTPATIENT)
Dept: INTERNAL MEDICINE | Age: 60
End: 2025-07-17
Payer: COMMERCIAL

## 2025-07-17 VITALS
OXYGEN SATURATION: 97 % | HEART RATE: 63 BPM | HEIGHT: 62 IN | SYSTOLIC BLOOD PRESSURE: 118 MMHG | BODY MASS INDEX: 36.88 KG/M2 | DIASTOLIC BLOOD PRESSURE: 78 MMHG | WEIGHT: 200.4 LBS | TEMPERATURE: 97.1 F

## 2025-07-17 DIAGNOSIS — G89.29 CHRONIC THUMB PAIN, LEFT: ICD-10-CM

## 2025-07-17 DIAGNOSIS — E66.812 CLASS 2 SEVERE OBESITY DUE TO EXCESS CALORIES WITH SERIOUS COMORBIDITY AND BODY MASS INDEX (BMI) OF 36.0 TO 36.9 IN ADULT (HCC): Primary | ICD-10-CM

## 2025-07-17 DIAGNOSIS — M79.645 CHRONIC THUMB PAIN, LEFT: ICD-10-CM

## 2025-07-17 DIAGNOSIS — E66.01 CLASS 2 SEVERE OBESITY DUE TO EXCESS CALORIES WITH SERIOUS COMORBIDITY AND BODY MASS INDEX (BMI) OF 36.0 TO 36.9 IN ADULT (HCC): Primary | ICD-10-CM

## 2025-07-17 DIAGNOSIS — M25.842 CYST OF JOINT OF LEFT HAND: ICD-10-CM

## 2025-07-17 PROBLEM — B34.9 VIRAL ILLNESS: Status: RESOLVED | Noted: 2025-02-25 | Resolved: 2025-07-17

## 2025-07-17 PROBLEM — B96.89 BACTERIAL SINUSITIS: Status: RESOLVED | Noted: 2025-02-25 | Resolved: 2025-07-17

## 2025-07-17 PROBLEM — J32.9 BACTERIAL SINUSITIS: Status: RESOLVED | Noted: 2025-02-25 | Resolved: 2025-07-17

## 2025-07-17 PROBLEM — F41.1 GAD (GENERALIZED ANXIETY DISORDER): Status: RESOLVED | Noted: 2022-10-10 | Resolved: 2025-07-17

## 2025-07-17 PROCEDURE — 99214 OFFICE O/P EST MOD 30 MIN: CPT | Performed by: NURSE PRACTITIONER

## 2025-07-17 PROCEDURE — 3017F COLORECTAL CA SCREEN DOC REV: CPT | Performed by: NURSE PRACTITIONER

## 2025-07-17 PROCEDURE — G8427 DOCREV CUR MEDS BY ELIG CLIN: HCPCS | Performed by: NURSE PRACTITIONER

## 2025-07-17 PROCEDURE — 1036F TOBACCO NON-USER: CPT | Performed by: NURSE PRACTITIONER

## 2025-07-17 PROCEDURE — G8417 CALC BMI ABV UP PARAM F/U: HCPCS | Performed by: NURSE PRACTITIONER

## 2025-07-17 NOTE — PROGRESS NOTES
Subjective  Mireillesukhdeep Carr, 60 y.o. female Established patient presents today with:  Chief Complaint   Patient presents with    Discuss Medications     Semaglutide. She would like to go back on the medication    Mass     X 1 year. Left thumb. States it has a tingling sensation.       History of Present Illness  The patient presents today to discuss weight management options. She has class II obesity with a BMI of 36 and has gained 11 pounds since her last visit in February. She was previously on semaglutide for over 6 months, which she tolerated well and found effective. However, she discontinued the medication as she believed her weight was under control. Since then, she has regained 30 pounds and is interested in restarting semaglutide treatment.    She identifies as an emotional eater and is currently experiencing significant stress due to caring for her 90-year-old father and her 's recent triple bypass surgery. Her  also underwent a colon-related procedure and is currently in cardiac rehab. Additionally, he had a sleep study recently. The patient reports that her sisters are not very supportive, adding to her stress.    She has a mass on her left thumb, which has been present for some time. Her career involves extensive typing. The area around the thumb is painful, although it does not hurt when pressed. She experiences numbness and tingling in the thumb, which becomes more pronounced when she picks up a pen or bumps it against a counter. She has received injections in the past, which provided temporary relief before the symptoms returned. She has been using Aspercreme and IcyHot for relief, and occasionally applies Voltaren to her hands and feet after washing them.    Social History:  Occupations:     PAST SURGICAL HISTORY:  Her  had a triple bypass surgery and a colon-related procedure.  She has received injections in her thumb for pain relief.      Past Medical History:

## 2025-07-21 ENCOUNTER — OFFICE VISIT (OUTPATIENT)
Dept: ORTHOPEDIC SURGERY | Age: 60
End: 2025-07-21
Payer: COMMERCIAL

## 2025-07-21 ENCOUNTER — HOSPITAL ENCOUNTER (OUTPATIENT)
Dept: GENERAL RADIOLOGY | Age: 60
Discharge: HOME | End: 2025-07-23
Payer: COMMERCIAL

## 2025-07-21 ENCOUNTER — APPOINTMENT (OUTPATIENT)
Dept: LAB | Age: 60
End: 2025-07-21
Payer: COMMERCIAL

## 2025-07-21 ENCOUNTER — HOSPITAL ENCOUNTER (OUTPATIENT)
Age: 60
Discharge: HOME | End: 2025-07-23
Payer: COMMERCIAL

## 2025-07-21 VITALS — BODY MASS INDEX: 36.8 KG/M2 | WEIGHT: 200 LBS | HEIGHT: 62 IN

## 2025-07-21 DIAGNOSIS — R22.32 MASS OF LEFT HAND: ICD-10-CM

## 2025-07-21 DIAGNOSIS — M18.12 PRIMARY OSTEOARTHRITIS OF FIRST CARPOMETACARPAL JOINT OF LEFT HAND: Primary | ICD-10-CM

## 2025-07-21 DIAGNOSIS — M67.40 GANGLION CYST: ICD-10-CM

## 2025-07-21 PROCEDURE — 1036F TOBACCO NON-USER: CPT | Performed by: STUDENT IN AN ORGANIZED HEALTH CARE EDUCATION/TRAINING PROGRAM

## 2025-07-21 PROCEDURE — G8427 DOCREV CUR MEDS BY ELIG CLIN: HCPCS | Performed by: STUDENT IN AN ORGANIZED HEALTH CARE EDUCATION/TRAINING PROGRAM

## 2025-07-21 PROCEDURE — 3017F COLORECTAL CA SCREEN DOC REV: CPT | Performed by: STUDENT IN AN ORGANIZED HEALTH CARE EDUCATION/TRAINING PROGRAM

## 2025-07-21 PROCEDURE — 99204 OFFICE O/P NEW MOD 45 MIN: CPT | Performed by: STUDENT IN AN ORGANIZED HEALTH CARE EDUCATION/TRAINING PROGRAM

## 2025-07-21 PROCEDURE — 73130 X-RAY EXAM OF HAND: CPT

## 2025-07-21 PROCEDURE — G8417 CALC BMI ABV UP PARAM F/U: HCPCS | Performed by: STUDENT IN AN ORGANIZED HEALTH CARE EDUCATION/TRAINING PROGRAM

## 2025-07-21 RX ORDER — MELOXICAM 15 MG/1
15 TABLET ORAL DAILY
Qty: 30 TABLET | Refills: 2 | Status: SHIPPED | OUTPATIENT
Start: 2025-07-21

## 2025-07-21 ASSESSMENT — ENCOUNTER SYMPTOMS
COLOR CHANGE: 0
NAUSEA: 0
VOMITING: 0
SHORTNESS OF BREATH: 0

## 2025-07-21 NOTE — PROGRESS NOTES
Subjective:      Patient ID: Mireille Carr is a 60 y.o. female who presents today for:  Chief Complaint   Patient presents with    New Patient     Left hand cyst- has been there for 1 year and its causing her some pain and she types all day at work . Has some numbness   Xray today        HPI  60-year-old female presents with complaints of left thumb pain and a mass of the left hand.  She has had progressively worsening symptoms for approximately 1 year.  She localizes the pain to the base of her left thumb.  She states the pain is constant, mild to moderate, exacerbated with activity and remitted somewhat at rest.    Past Medical History:   Diagnosis Date    Abdominal bloating 2024    Anxiety     Asthma Child    Belching symptom 2024    Calculus of gallbladder 2014    Dermatitis 10/14/2024    GERD (gastroesophageal reflux disease) 38 yrs old    Polyps    H/O total hysterectomy 10/10/2022    Obesity Adult life    Would like to discuss options of possible medication or over the counter suggestions    Osteoarthritis 2016    Need bone density    Osteopenia 2013    Urinary incontinence Last few years      Past Surgical History:   Procedure Laterality Date     SECTION      C section on 3 live births    CHOLECYSTECTOMY      COLONOSCOPY N/A 2024    COLONOSCOPY DIAGNOSTIC with biopsy performed by Dean Lilly MD at [facility]    HYSTERECTOMY (CERVIX STATUS UNKNOWN)      HYSTERECTOMY, TOTAL ABDOMINAL (CERVIX REMOVED)      Endometriosis    UPPER GASTROINTESTINAL ENDOSCOPY N/A 2024    ESOPHAGOGASTRODUODENOSCOPY with biopsy performed by Dean Lilly MD at [facility]     Social History     Socioeconomic History    Marital status:      Spouse name: Not on file    Number of children: Not on file    Years of education: Not on file    Highest education level: Not on file   Occupational History    Not on file   Tobacco Use    Smoking status: Never

## 2025-08-21 DIAGNOSIS — E66.01 CLASS 2 SEVERE OBESITY DUE TO EXCESS CALORIES WITH SERIOUS COMORBIDITY AND BODY MASS INDEX (BMI) OF 36.0 TO 36.9 IN ADULT (HCC): ICD-10-CM

## 2025-08-21 DIAGNOSIS — E66.812 CLASS 2 SEVERE OBESITY DUE TO EXCESS CALORIES WITH SERIOUS COMORBIDITY AND BODY MASS INDEX (BMI) OF 36.0 TO 36.9 IN ADULT (HCC): ICD-10-CM

## (undated) DEVICE — FORCEPS BX L240CM JAW DIA2.8MM L CAP W/ NDL MIC MESH TOOTH

## (undated) DEVICE — CONMED SCOPE SAVER BITE BLOCK, 20X27 MM: Brand: SCOPE SAVER

## (undated) DEVICE — TUBING, SUCTION, 1/4" X 10', STRAIGHT: Brand: MEDLINE

## (undated) DEVICE — SINGLE PORT MANIFOLD: Brand: NEPTUNE 2

## (undated) DEVICE — TUBING IRRIGATION 140/160/180/190 SER GI ENDOSCP SMARTCAP

## (undated) DEVICE — BRUSH ENDO CLN L90.5IN SHTH DIA1.7MM BRIST DIA5-7MM 2-6MM

## (undated) DEVICE — TUBE SET 96 MM 64 MM H2O PERISTALTIC STD AUX CHANNEL

## (undated) DEVICE — ENDO CARRY-ON PROCEDURE KIT: Brand: ENDO CARRY-ON PROCEDURE KIT